# Patient Record
Sex: FEMALE | URBAN - NONMETROPOLITAN AREA
[De-identification: names, ages, dates, MRNs, and addresses within clinical notes are randomized per-mention and may not be internally consistent; named-entity substitution may affect disease eponyms.]

---

## 2020-07-10 ENCOUNTER — APPOINTMENT (OUTPATIENT)
Dept: URBAN - NONMETROPOLITAN AREA CLINIC 53 | Age: 80
Setting detail: DERMATOLOGY
End: 2020-07-10

## 2020-07-10 DIAGNOSIS — L82.1 OTHER SEBORRHEIC KERATOSIS: ICD-10-CM

## 2020-07-10 DIAGNOSIS — L57.8 OTHER SKIN CHANGES DUE TO CHRONIC EXPOSURE TO NONIONIZING RADIATION: ICD-10-CM

## 2020-07-10 PROCEDURE — OTHER NOTED ON EXAM BUT NOT TREATED: OTHER

## 2020-07-10 ASSESSMENT — LOCATION SIMPLE DESCRIPTION DERM
LOCATION SIMPLE: RIGHT POSTERIOR UPPER ARM
LOCATION SIMPLE: RIGHT CHEEK
LOCATION SIMPLE: UPPER BACK
LOCATION SIMPLE: LEFT POSTERIOR UPPER ARM

## 2020-07-10 ASSESSMENT — LOCATION ZONE DERM
LOCATION ZONE: FACE
LOCATION ZONE: ARM
LOCATION ZONE: TRUNK

## 2020-07-10 ASSESSMENT — LOCATION DETAILED DESCRIPTION DERM
LOCATION DETAILED: RIGHT INFERIOR LATERAL MALAR CHEEK
LOCATION DETAILED: INFERIOR THORACIC SPINE
LOCATION DETAILED: LEFT PROXIMAL POSTERIOR UPPER ARM
LOCATION DETAILED: RIGHT DISTAL POSTERIOR UPPER ARM

## 2021-01-03 ENCOUNTER — HOSPITAL ENCOUNTER (INPATIENT)
Dept: HOSPITAL 47 - EC | Age: 81
LOS: 3 days | Discharge: NURSING, REHAB OR LONG-TERM CARE | DRG: 178 | End: 2021-01-06
Attending: HOSPITALIST | Admitting: HOSPITALIST
Payer: MEDICARE

## 2021-01-03 VITALS — BODY MASS INDEX: 29.2 KG/M2

## 2021-01-03 DIAGNOSIS — Z90.710: ICD-10-CM

## 2021-01-03 DIAGNOSIS — Z79.899: ICD-10-CM

## 2021-01-03 DIAGNOSIS — K44.9: ICD-10-CM

## 2021-01-03 DIAGNOSIS — R09.02: ICD-10-CM

## 2021-01-03 DIAGNOSIS — K21.9: ICD-10-CM

## 2021-01-03 DIAGNOSIS — J84.10: ICD-10-CM

## 2021-01-03 DIAGNOSIS — M19.90: ICD-10-CM

## 2021-01-03 DIAGNOSIS — G89.29: ICD-10-CM

## 2021-01-03 DIAGNOSIS — Z87.891: ICD-10-CM

## 2021-01-03 DIAGNOSIS — J15.6: Primary | ICD-10-CM

## 2021-01-03 DIAGNOSIS — M06.9: ICD-10-CM

## 2021-01-03 DIAGNOSIS — K80.20: ICD-10-CM

## 2021-01-03 DIAGNOSIS — E87.1: ICD-10-CM

## 2021-01-03 DIAGNOSIS — I11.9: ICD-10-CM

## 2021-01-03 DIAGNOSIS — Z87.01: ICD-10-CM

## 2021-01-03 DIAGNOSIS — Z86.16: ICD-10-CM

## 2021-01-03 PROCEDURE — 71046 X-RAY EXAM CHEST 2 VIEWS: CPT

## 2021-01-03 PROCEDURE — 80053 COMPREHEN METABOLIC PANEL: CPT

## 2021-01-03 PROCEDURE — 85025 COMPLETE CBC W/AUTO DIFF WBC: CPT

## 2021-01-03 PROCEDURE — 85379 FIBRIN DEGRADATION QUANT: CPT

## 2021-01-03 PROCEDURE — 99285 EMERGENCY DEPT VISIT HI MDM: CPT

## 2021-01-03 PROCEDURE — 84100 ASSAY OF PHOSPHORUS: CPT

## 2021-01-03 PROCEDURE — 87635 SARS-COV-2 COVID-19 AMP PRB: CPT

## 2021-01-03 PROCEDURE — 83735 ASSAY OF MAGNESIUM: CPT

## 2021-01-03 PROCEDURE — 71250 CT THORAX DX C-: CPT

## 2021-01-03 PROCEDURE — 71275 CT ANGIOGRAPHY CHEST: CPT

## 2021-01-03 PROCEDURE — 36415 COLL VENOUS BLD VENIPUNCTURE: CPT

## 2021-01-03 PROCEDURE — 84145 PROCALCITONIN (PCT): CPT

## 2021-01-03 RX ADMIN — AZITHROMYCIN SCH MG: 500 TABLET, FILM COATED ORAL at 22:17

## 2021-01-03 RX ADMIN — CEFAZOLIN SCH MLS/HR: 330 INJECTION, POWDER, FOR SOLUTION INTRAMUSCULAR; INTRAVENOUS at 22:19

## 2021-01-03 RX ADMIN — CEFAZOLIN SCH MLS/HR: 330 INJECTION, POWDER, FOR SOLUTION INTRAMUSCULAR; INTRAVENOUS at 13:27

## 2021-01-03 RX ADMIN — HYDROCODONE BITARTRATE AND ACETAMINOPHEN PRN EACH: 5; 325 TABLET ORAL at 22:16

## 2021-01-03 NOTE — P.HPIM
History of Present Illness


H&P Date: 01/03/21


Chief Complaint: Progressive increased weakness and shortness of breath for 7-10

days





This is a 80-year-old female history of covert 19 pneumonia about 2 months ago, 

patient lately for the last 10 days having increasing shortness of breath, 

weakness, which is definitely worse than before, denies any chest pain denies 

any abdominal pain denies any nausea vomiting diarrhea, patient also has chronic

history of hyponatremia patient presented to [facility] she was found to 

have elevated BNP of 1040, cardiac enzymes were normal, CRP was high at 58 

covert infection was negative patient was given Rocephin and Zithromax and 

transferred to [facility] from [place], chest x-ray significant for 

cardiomegaly with bilateral bibasilar and right upper lobe infiltrate, computed 

tomography scan of the chest failed to reveal any pulmonary embolism however 

bilateral interstitial Petrin was noted





Review of Systems


All systems: negative





Past Medical History


Past Medical History: Hypertension, Pneumonia, Rheumatoid Arthritis (RA)


Additional Past Medical History / Comment(s): right shoulder pain


History of Any Multi-Drug Resistant Organisms: None Reported


Past Surgical History: Hysterectomy


Additional Past Surgical History / Comment(s): 3 X Back surgeries, ankle surgery


Smoking Status: Former smoker





- Past Family History


  ** Father


Family Medical History: No Reported History





Medications and Allergies


                                Home Medications











 Medication  Instructions  Recorded  Confirmed  Type


 


Celecoxib [CeleBREX] 400 mg PO DAILY 01/03/21 01/03/21 History


 


Fluticasone Nasal Spray [Flonase 1 spr EA NOSTRIL DAILY 01/03/21 01/03/21 

History





Nasal Spray]    


 


Losartan Potassium 100 mg PO DAILY 01/03/21 01/03/21 History


 


Nitrofurantoin Monohyd/M-Cryst 100 mg PO MOWEFR 01/03/21 01/03/21 History





[Macrobid]    


 


Omeprazole 20 mg PO DAILY 01/03/21 01/03/21 History


 


Xeljanz Unknown Dose 1 tab PO DAILY 01/03/21 01/03/21 History


 


predniSONE See Taper PO DAILY 01/03/21 01/03/21 History








                                    Allergies











Allergy/AdvReac Type Severity Reaction Status Date / Time


 


No Known Allergies Allergy   Verified 01/03/21 12:05














Physical Exam


Vitals: 


                                   Vital Signs











  Temp Pulse Pulse Resp BP BP Pulse Ox


 


 01/03/21 15:31  98.1 F   85  18   147/89  97


 


 01/03/21 15:09  98.3 F  87   18  156/81   98


 


 01/03/21 13:04     18   


 


 01/03/21 12:10     18   


 


 01/03/21 11:59  98 F  87   16  116/57   97








                                Intake and Output











 01/03/21 01/03/21 01/03/21





 06:59 14:59 22:59


 


Intake Total   75


 


Balance   75


 


Intake:   


 


  Amount of Fluid Infused (   75





  ml)   


 


Other:   


 


  Weight  87.09 kg 87.09 kg














- Constitutional


General appearance: average body habitus, disheveled





- EENT


Eyes: PERRLA


Ears: bilateral: normal





- Neck


Carotids: bilateral: upstroke normal


Thyroid: bilateral: normal size





- Respiratory


Respiratory: bilateral: diminished





- Cardiovascular


Rhythm: regular


Heart sounds: normal: S1, S2





- Gastrointestinal


General gastrointestinal: decreased bowel sounds, soft





- Integumentary


Integumentary: normal turgor





- Neurologic


Neurologic: CNII-XII intact





- Musculoskeletal


Musculoskeletal: gait normal, generalized weakness, strength equal bilaterally





- Psychiatric


Psychiatric: A&O x's 3, appropriate affect, intact judgment & insight





Results


Labs: 


                  Abnormal Lab Results - Last 24 Hours (Table)











  01/03/21 Range/Units





  13:43 


 


D-Dimer  1.56 H  (<0.60)  mg/L FEU











Chest x-ray: report reviewed, image reviewed


Abdominal x-ray: report reviewed, image reviewed





Thrombosis Risk Factor Assmnt





- Choose All That Apply


Any of the Below Risk Factors Present?: Yes


Each Factor Represents 1 point: Medical pt on bed rest


Each Risk Factor Represents 3 Points: Age 75 years or older


Thrombosis Risk Factor Assessment Total Risk Factor Score: 4


Thrombosis Risk Factor Assessment Level: Moderate Risk





Assessment and Plan


Assessment: 





Bilateral pneumonia





Pulmonary fibrosis





covid 19 pneumonia few months ago





Hypertension hypertensive cardiovascular disease





GERD





Degenerative joint disease osteoarthritis


Plan: 





Broad-spectrum antibiotics





Oral Decadron





Deep breathing sense incentive spirometry





Vitamin C, D, zinc








Time with Patient: Greater than 30

## 2021-01-03 NOTE — CT
EXAMINATION TYPE: CT chest angio for PE

 

DATE OF EXAM: 1/3/2021

 

COMPARISON: None

 

HISTORY: Shortness of breath.

 

CT DLP: 519 mGycm

Automated exposure control for dose reduction was used.

 

CONTRAST: 

Performed with IV Contrast, patient injected with 100 mL of Isovue 370.

 

 

Images obtained from the thoracic inlet to the diaphragm with IV contrast and 3-D post processed imag
es.

 

There is some coarse interstitial density throughout the lungs. There is no pulmonary consolidation. 
Heart is enlarged. There is no pericardial effusion. There is no pleural effusion. I see no definite 
filling defect in the pulmonary arteries. There are no hilar masses. There is no mediastinal adenopat
hy. Thoracic aorta is atheromatous. There is posterior fusion surgery in the lumbar spine. There is 1
5% compression deformity of T12 vertebra.

 

IMPRESSION:

No evidence of pulmonary embolism. Pulmonary interstitial infiltrates probably due to pulmonary fibro
sis. Cardiomegaly.

## 2021-01-03 NOTE — ED
SOB HPI





- General


Chief Complaint: Shortness of Breath


Stated Complaint: Pneumonia


Time Seen by Provider: 01/03/21 12:01


Source: patient, EMS


Mode of arrival: EMS


Limitations: no limitations





- History of Present Illness


Initial Comments: 


80-year-old  iwht history of Covid 19 infection "a few months ago female 

presenting for weakness, shortness of breath x7-10 days.  Patient states for the

past week+ she has had increasing weakness and shortness of breath. Pt states 

SOB is chronic but is increased wtih ambulation. She denies chest pain, pain 

with deep breath, nausea, abdominal pain, jaw pain,leg swelling calf pain 

hemoptysis or history of active cancer patient's ,. Admits to chronic right 

shoulder pain. XR yoni no acute process. no trauma. she states she does 

suffer from chronic hyponatremia.  Patient states that having low sodium is "the

story of her life".  Patient states she initially presented earlier today at 

[facility] where she had evaluation including: cardiac enzymes which were

WNL, BNP was 1040, coagulation studies within normal limits, phosphorus within 

normal limits, sodium 123, both potassium and chloride within normal limits. No 

other CMP derrangement noted. Magensium 1.7. Lactic 0.8. No hx of fevers. CRP 

elevated at 58. Covid (-). WBC 12.06. HgB 12.3, Cr 0.7 and BUN 16. Pt does not 

appear in respiratory distress on arrival. She was givne both rocephin and 

azithromycin at [facility] prior to transfer. 








- Related Data


                                Home Medications











 Medication  Instructions  Recorded  Confirmed


 


Celecoxib [CeleBREX] 400 mg PO DAILY 01/03/21 01/03/21


 


Fluticasone Nasal Spray [Flonase 1 spr EA NOSTRIL DAILY 01/03/21 01/03/21





Nasal [place]]   


 


Losartan Potassium 100 mg PO DAILY 01/03/21 01/03/21


 


Nitrofurantoin Monohyd/M-Cryst 100 mg PO MOWEFR 01/03/21 01/03/21





[Macrobid]   


 


Omeprazole 20 mg PO DAILY 01/03/21 01/03/21


 


Xeljanz Unknown Dose 1 tab PO DAILY 01/03/21 01/03/21


 


predniSONE See Taper PO DAILY 01/03/21 01/03/21











                                    Allergies











Allergy/AdvReac Type Severity Reaction Status Date / Time


 


No Known Allergies Allergy   Verified 01/03/21 12:05














Review of Systems


ROS Statement: 


Those systems with pertinent positive or pertinent negative responses have been 

documented in the HPI.





ROS Other: All systems not noted in ROS Statement are negative.





Past Medical History


Past Medical History: Hypertension, Pneumonia


History of Any Multi-Drug Resistant Organisms: None Reported


Past Surgical History: Hysterectomy


Additional Past Surgical History / Comment(s): 3 X Back surgeries


Past Psychological History: No Psychological Hx Reported


Smoking Status: Former smoker


Past Alcohol Use History: None Reported


Past Drug Use History: None Reported





General Exam





- General Exam Comments


Initial Comments: 


General:  The patient is awake and alert, in no distress


Eye:  Pupils are equal, round and reactive to light, extra-ocular movements are 

intact.  No nystagmus.  There is normal conjunctiva bilaterally.  No signs of 

icterus.  


Ears, nose, mouth and throat:  There are moist mucous membranes and no oral 

lesions. 


Neck:  The neck is supple, there is no tenderness or JVD.  


Cardiovascular:  There is a regular rate and rhythm. No murmur, rub or gallop is

 appreciated.


Respiratory:  Lungs are clear to auscultation, respirations are non-labored, 

breath sounds are equal.  No wheezes, stridor, rales, or rhonchi.


Gastrointestinal:  Soft, non-distended, non-tender abdomen without masses or 

organomegaly noted. There is no rebound or guarding present. 


Musculoskeletal:  Normal ROM, no tenderness.  Strength 5/5. Sensation intact. 

Radial and DP pulses equal bilaterally 2+.  


Neurological:  A&O x 3. CN II-XII intact, There are no obvious motor or sensory 

deficits. Coordination appears grossly intact. Speech is normal.


Skin:  Skin is warm and dry and no rashes or lesions are noted. No calf pain, No

 LE edema.


Psychiatric:  Cooperative, appropriate mood & affect, normal judgment.  





Limitations: no limitations





Course


                                   Vital Signs











  01/03/21 01/03/21 01/03/21





  11:59 12:10 13:04


 


Temperature 98 F  


 


Pulse Rate 87  


 


Respiratory 16 18 18





Rate   


 


Blood Pressure 116/57  


 


O2 Sat by Pulse 97  





Oximetry   














  01/03/21





  15:09


 


Temperature 98.3 F


 


Pulse Rate 87


 


Respiratory 18





Rate 


 


Blood Pressure 156/81


 


O2 Sat by Pulse 98





Oximetry 














Medical Decision Making





- Medical Decision Making


transfer for dyspnea, right shoulder pain, pneumonia from [facility]. Labs reveal

 hyponatremia. pt on IVF. pt has history of bsaeline in 130s. patient has recent

 covid infection in november. covid (-) at [place]. Patient CXR infiltrate. 

Patient initiated on abx at outside facilities. patient cardiac enzymes (-). 

Dimer evalted. CTA revealed no pulmonary embolism. pt will be admitted for 

further monitoring.  [facility]











- Lab Data


                                   Lab Results











  01/03/21 Range/Units





  13:43 


 


D-Dimer  1.56 H  (<0.60)  mg/L FEU














Disposition


Clinical Impression: 


 Dyspnea, Pneumonia, Right shoulder pain





Disposition: ADMITTED AS IP TO THIS HOSP


Condition: Stable


Is patient prescribed a controlled substance at d/c from ED?: No


Time of Disposition: 15:23


Decision to Admit Reason: Admit from EC


Decision Date: 01/03/21


Decision Time: 13:23

## 2021-01-03 NOTE — XR
EXAMINATION TYPE: XR chest 2V

 

DATE OF EXAM: 1/3/2021

 

COMPARISON: Chest x-ray March 13, 2014. Outside chest x-ray earlier today

 

HISTORY: Shortness of breath

 

TECHNIQUE:  Frontal and lateral views of the chest are obtained.

 

FINDINGS:  There is chronic parenchymal change with increased right upper lung and bibasilar opacitie
s.  No pleural effusion or pneumothorax seen bilaterally. The cardiac silhouette size is stable and m
ildly enlarged.   The osseous structures are intact.

 

IMPRESSION:  Mild cardiomegaly with bibasilar and right upper lung acute infiltrates and/or atelectas
is.

## 2021-01-04 LAB
ALBUMIN SERPL-MCNC: 3.8 G/DL (ref 3.8–4.9)
ALBUMIN/GLOB SERPL: 1.9 G/DL (ref 1.6–3.17)
ALP SERPL-CCNC: 80 U/L (ref 41–126)
ALT SERPL-CCNC: 19 U/L (ref 8–44)
ANION GAP SERPL CALC-SCNC: 6.2 MMOL/L (ref 4–12)
AST SERPL-CCNC: 19 U/L (ref 13–35)
BASOPHILS # BLD AUTO: 0 K/UL (ref 0–0.2)
BASOPHILS NFR BLD AUTO: 0 %
BUN SERPL-SCNC: 19 MG/DL (ref 9–27)
BUN/CREAT SERPL: 31.67 RATIO (ref 12–20)
CALCIUM SPEC-MCNC: 9 MG/DL (ref 8.7–10.3)
CHLORIDE SERPL-SCNC: 99 MMOL/L (ref 96–109)
CO2 SERPL-SCNC: 21.8 MMOL/L (ref 21.6–31.8)
EOSINOPHIL # BLD AUTO: 0 K/UL (ref 0–0.7)
EOSINOPHIL NFR BLD AUTO: 0 %
ERYTHROCYTE [DISTWIDTH] IN BLOOD BY AUTOMATED COUNT: 3.56 M/UL (ref 3.8–5.4)
ERYTHROCYTE [DISTWIDTH] IN BLOOD: 14.4 % (ref 11.5–15.5)
GLOBULIN SER CALC-MCNC: 2 G/DL (ref 1.6–3.3)
GLUCOSE SERPL-MCNC: 129 MG/DL (ref 70–110)
HCT VFR BLD AUTO: 34.4 % (ref 34–46)
HGB BLD-MCNC: 11.4 GM/DL (ref 11.4–16)
LYMPHOCYTES # SPEC AUTO: 0.4 K/UL (ref 1–4.8)
LYMPHOCYTES NFR SPEC AUTO: 6 %
MAGNESIUM SPEC-SCNC: 1.8 MG/DL (ref 1.5–2.4)
MCH RBC QN AUTO: 32.1 PG (ref 25–35)
MCHC RBC AUTO-ENTMCNC: 33.2 G/DL (ref 31–37)
MCV RBC AUTO: 96.6 FL (ref 80–100)
MONOCYTES # BLD AUTO: 0.2 K/UL (ref 0–1)
MONOCYTES NFR BLD AUTO: 3 %
NEUTROPHILS # BLD AUTO: 6 K/UL (ref 1.3–7.7)
NEUTROPHILS NFR BLD AUTO: 90 %
PLATELET # BLD AUTO: 366 K/UL (ref 150–450)
POTASSIUM SERPL-SCNC: 4.9 MMOL/L (ref 3.5–5.5)
PROT SERPL-MCNC: 5.8 G/DL (ref 6.2–8.2)
SODIUM SERPL-SCNC: 127 MMOL/L (ref 135–145)
WBC # BLD AUTO: 6.7 K/UL (ref 3.8–10.6)

## 2021-01-04 RX ADMIN — FLUTICASONE PROPIONATE SCH SPRAY: 50 SPRAY, METERED NASAL at 09:50

## 2021-01-04 RX ADMIN — HYDROCODONE BITARTRATE AND ACETAMINOPHEN PRN EACH: 5; 325 TABLET ORAL at 20:21

## 2021-01-04 RX ADMIN — Medication SCH UNIT: at 09:47

## 2021-01-04 RX ADMIN — Medication SCH MG: at 09:47

## 2021-01-04 RX ADMIN — MELOXICAM SCH MG: 7.5 TABLET ORAL at 09:48

## 2021-01-04 RX ADMIN — AZITHROMYCIN SCH MG: 500 TABLET, FILM COATED ORAL at 20:15

## 2021-01-04 RX ADMIN — NITROFURANTOIN (MONOHYDRATE/MACROCRYSTALS) SCH MG: 75; 25 CAPSULE ORAL at 09:47

## 2021-01-04 RX ADMIN — LOSARTAN POTASSIUM SCH MG: 50 TABLET, FILM COATED ORAL at 09:49

## 2021-01-04 RX ADMIN — OXYCODONE HYDROCHLORIDE AND ACETAMINOPHEN SCH MG: 500 TABLET ORAL at 09:50

## 2021-01-04 RX ADMIN — PANTOPRAZOLE SODIUM SCH MG: 40 TABLET, DELAYED RELEASE ORAL at 09:50

## 2021-01-04 RX ADMIN — CALCIUM CARBONATE (ANTACID) CHEW TAB 500 MG PRN MG: 500 CHEW TAB at 20:15

## 2021-01-05 RX ADMIN — CEFAZOLIN SCH: 330 INJECTION, POWDER, FOR SOLUTION INTRAMUSCULAR; INTRAVENOUS at 13:07

## 2021-01-05 RX ADMIN — LOSARTAN POTASSIUM SCH MG: 50 TABLET, FILM COATED ORAL at 08:16

## 2021-01-05 RX ADMIN — CALCIUM CARBONATE (ANTACID) CHEW TAB 500 MG PRN MG: 500 CHEW TAB at 23:33

## 2021-01-05 RX ADMIN — MELOXICAM SCH MG: 7.5 TABLET ORAL at 08:16

## 2021-01-05 RX ADMIN — Medication SCH MG: at 08:16

## 2021-01-05 RX ADMIN — CEFAZOLIN SCH: 330 INJECTION, POWDER, FOR SOLUTION INTRAMUSCULAR; INTRAVENOUS at 00:33

## 2021-01-05 RX ADMIN — Medication SCH UNIT: at 08:17

## 2021-01-05 RX ADMIN — CEFAZOLIN SCH: 330 INJECTION, POWDER, FOR SOLUTION INTRAMUSCULAR; INTRAVENOUS at 18:20

## 2021-01-05 RX ADMIN — FLUTICASONE PROPIONATE SCH SPRAY: 50 SPRAY, METERED NASAL at 08:22

## 2021-01-05 RX ADMIN — HYDROCODONE BITARTRATE AND ACETAMINOPHEN PRN EACH: 5; 325 TABLET ORAL at 21:31

## 2021-01-05 RX ADMIN — OXYCODONE HYDROCHLORIDE AND ACETAMINOPHEN SCH MG: 500 TABLET ORAL at 08:17

## 2021-01-05 RX ADMIN — PANTOPRAZOLE SODIUM SCH MG: 40 TABLET, DELAYED RELEASE ORAL at 08:17

## 2021-01-05 RX ADMIN — AZITHROMYCIN SCH MG: 500 TABLET, FILM COATED ORAL at 21:31

## 2021-01-05 NOTE — P.CNPUL
History of Present Illness


Consult date: 01/05/21


Reason for consult: dyspnea, cough, hypoxemia, pneumonia


Chief complaint: Shortness of breath


History of present illness: 


This is a 80-year-old female history of covid 19 pneumonia about 2 months ago, 

patient lately for the last 10 days having increasing shortness of breath, 

weakness, which is definitely worse than before, denies any chest pain denies 

any abdominal pain denies any nausea vomiting diarrhea, patient also has chronic

history of hyponatremia patient presented to [facility] she was found to 

have elevated BNP of 1040, cardiac enzymes were normal, CRP was high at 58 

covert infection was negative patient was given Rocephin and Zithromax and 

transferred to [facility] from [place], chest x-ray significant for 

cardiomegaly with bilateral bibasilar and right upper lobe infiltrate, computed 

tomography scan of the chest failed to reveal any pulmonary embolism however 

bilateral interstitial Pattern was noted, currently patient is being treated for

secondary bacterial pneumonia with IV antibiotics along with dexamethasone usual

therapy for Covid 19 pneumonia she is afebrile with oxygen saturation of 96% 

hemodynamic status stable, she underwent a computed tomography scan of the chest

revealed interstitial lung disease or a fibrosis and gallstones








Review of Systems


All systems: negative





Past Medical History


Past Medical History: Hypertension, Pneumonia, Rheumatoid Arthritis (RA)


Additional Past Medical History / Comment(s): right shoulder pain


History of Any Multi-Drug Resistant Organisms: None Reported


Past Surgical History: Hysterectomy


Additional Past Surgical History / Comment(s): 3 X Back surgeries, ankle surgery


Smoking Status: Former smoker





- Past Family History


  ** Father


Family Medical History: No Reported History





Medications and Allergies


                                Home Medications











 Medication  Instructions  Recorded  Confirmed  Type


 


Celecoxib [CeleBREX] 400 mg PO DAILY 01/03/21 01/03/21 History


 


Fluticasone Nasal Spray [Flonase 1 spr EA NOSTRIL DAILY 01/03/21 01/03/21 

History





Nasal Spray]    


 


Losartan Potassium 100 mg PO DAILY 01/03/21 01/03/21 History


 


Nitrofurantoin Monohyd/M-Cryst 100 mg PO MOWEFR 01/03/21 01/03/21 History





[Macrobid]    


 


Omeprazole 20 mg PO DAILY 01/03/21 01/03/21 History


 


predniSONE See Taper PO DAILY 01/03/21 01/03/21 History


 


Tofacitinib Citrate [Xeljanz Xr] 11 mg PO DAILY 01/04/21 01/04/21 History








                                    Allergies











Allergy/AdvReac Type Severity Reaction Status Date / Time


 


No Known Allergies Allergy   Verified 01/03/21 12:05














Physical Exam


Vitals: 


                                   Vital Signs











  Temp Pulse Resp BP Pulse Ox


 


 01/05/21 05:00  98 F  81  18  161/83  97


 


 01/04/21 21:26  97.5 F L  82  18  154/71  96


 


 01/04/21 17:00  98.3 F  83  16  137/82  94 L








                                Intake and Output











 01/04/21 01/05/21 01/05/21





 22:59 06:59 14:59


 


Intake Total 50  


 


Balance 50  


 


Intake:   


 


  Intake, IV Titration 50  





  Amount   


 


    cefTRIAXone 1 gm In 50  





    Sodium Chloride 0.9% 50   





    ml @ 100 mls/hr IVPB   





    Q24HR Count includes the [facility] Rx#:[id number]   


 


Other:   


 


  Voiding Method Bedside Commode  





 Diaper  


 


  # Voids  3 














- Constitutional


General appearance: average body habitus, cooperative





- EENT


Eyes: PERRLA


Ears: bilateral: normal





- Neck


Carotids: bilateral: upstroke normal


Thyroid: bilateral: normal size





- Respiratory


Respiratory: bilateral: CTA





- Cardiovascular


Rhythm: regular


Heart sounds: normal: S1, S2





- Gastrointestinal


General gastrointestinal: normal bowel sounds





- Neurologic


Neurologic: CNII-XII intact





- Musculoskeletal


Musculoskeletal: gait normal, generalized weakness, strength equal bilaterally





- Psychiatric


Psychiatric: A&O x's 3, appropriate affect, intact judgment & insight





Results





- Laboratory Findings


CBC and BMP: 


                                 01/04/21 05:35





                                 01/04/21 05:35


PT/INR, D-dimer











D-Dimer  1.56 mg/L FEU (<0.60)  H  01/03/21  13:43    








Abnormal lab findings: 


                                  Abnormal Labs











  01/03/21 01/04/21 01/04/21





  13:43 05:35 05:35


 


RBC   3.56 L 


 


Lymphocytes #   0.4 L 


 


D-Dimer  1.56 H  


 


Sodium    127 L


 


BUN/Creatinine Ratio    31.67 H


 


Glucose    129 H


 


Total Protein    5.8 L














- Diagnostic Findings


Chest x-ray: report reviewed, image reviewed


CT scan - chest: report reviewed (Finding as noted above), image reviewed





Assessment and Plan


Assessment: 


Secondary bacterial pneumonia





Pulmonary fibrosis may very well be related to Covid 19 infection recently





covid 19 pneumonia few months ago





Hypertension hypertensive cardiovascular disease





GERD





Degenerative joint disease osteoarthritis


Plan: 





Broad-spectrum antibiotics





Oral Decadron





Deep breathing sense incentive spirometry





Vitamin C, D, zinc





Monitor clinical course closely agree with discharge planning in next 24-48 

hours we'll follow up on outpatient basis











Time with Patient: Greater than 30

## 2021-01-05 NOTE — P.PN
Progress Note - Text


Progress Note Date: 01/05/21





Presenting complaint:


Shortness of breath





Interval history:


This is a patient with history of coronary 19 infection a few months ago 

presents with weakness shortness of breath for 7-10 days.  Especially with 

activity.  Has some chronic right shoulder pain.  Patient had low-sodium most of

her life.  Transferred here from [facility].


Today-a bit tired.  Has been in bed.  Oral intake fair.  Slight cough.





Review of systems: Was done for constitutional, cardiovascular, GI, pulmonary. 

relevant finding as above





Active Medications





Acetaminophen (Acetaminophen Tab 325 Mg Tab)  650 mg PO Q6HR PRN


   PRN Reason: Mild Pain or Fever > 100.5


Hydrocodone Bitart/Acetaminophen (Hydrocodone/Apap 5-325mg 1 Each Tab)  1 each 

PO Q8H PRN


   PRN Reason: Pain


   Last Admin: 01/05/21 21:31 Dose:  1 each


   Documented by: 


Ascorbic Acid (Ascorbic Acid 500 Mg Tab)  1,000 mg PO DAILY [facility]


   Last Admin: 01/05/21 08:17 Dose:  1,000 mg


   Documented by: 


Azithromycin (Azithromycin 500 Mg Tab)  500 mg PO HS [facility]


   Last Admin: 01/05/21 21:31 Dose:  500 mg


   Documented by: 


Calcium Carbonate/Glycine (Calcium Carbonate 500 Mg Chewable)  500 mg PO QID PRN


   PRN Reason: Heartburn


   Last Admin: 01/04/21 20:15 Dose:  500 mg


   Documented by: 


Cholecalciferol (Cholecalciferol 1,000 Unit Tab)  1,000 unit PO DAILY [facility]


   Last Admin: 01/05/21 08:17 Dose:  1,000 unit


   Documented by: 


Dexamethasone (Dexamethasone 2 Mg Tab)  6 mg PO DAILY [facility]


   Last Admin: 01/05/21 08:16 Dose:  6 mg


   Documented by: 


Fluticasone Propionate (Fluticasone 50mcg/Spray Nasal 16gm)  1 spray EA NOSTRIL 

DAILY [facility]


   Last Admin: 01/05/21 08:22 Dose:  1 spray


   Documented by: 


Sodium Chloride (Saline 0.9%)  1,000 mls @ 75 mls/hr IV .[id number] [facility]


   Last Admin: 01/05/21 18:20 Dose:  Not Given


   Documented by: 


Ceftriaxone Sodium 1 gm/ (Sodium Chloride)  50 mls @ 100 mls/hr IVPB Q24HR [facility]


   Last Admin: 01/05/21 08:15 Dose:  100 mls/hr


   Documented by: 


Losartan Potassium (Losartan 50 Mg Tab)  100 mg PO DAILY [facility]


   Last Admin: 01/05/21 08:16 Dose:  100 mg


   Documented by: 


Meloxicam (Meloxicam 7.5 Mg Tab)  15 mg PO DAILY [facility]


   Last Admin: 01/05/21 08:16 Dose:  15 mg


   Documented by: 


Naloxone HCl (Naloxone 0.4 Mg/Ml 1 Ml Vial)  0.2 mg IV Q2M PRN


   PRN Reason: Opioid Reversal


Nitrofurantoin Macrocrystals (Nitrofurantoin Monohyd/M-Cryst 100 Mg Cap)  100 mg

PO MOWEFR [facility]


   Last Admin: 01/04/21 09:47 Dose:  100 mg


   Documented by: 


Pantoprazole Sodium (Pantoprazole 40 Mg Tablet)  40 mg PO DAILY [facility]


   Last Admin: 01/05/21 08:17 Dose:  40 mg


   Documented by: 


Zinc Sulfate (Zinc Sulfate 220 Mg Cap)  220 mg PO DAILY [facility]


   Last Admin: 01/05/21 08:16 Dose:  220 mg


   Documented by: 





On examination:


VITAL SIGNS: 98.5, 82, 20, 149/66, 94% room air


GENERAL APPEARANCE: Laying in bed, comfortable


HEENT: Normal external appearance of nose and ear.  Oral cavity normal


EYES: Pupils equal. Conjunctiva normal. 


NECK: JVD not raised. Mass not palpable. 


RESPIRATORY: Respiratory effort increased, some basal fine crackles 


CARDIOVASCULAR: First and second sounds normal. No edema. 


ABDOMEN: Soft. Liver and spleen not palpable. No tenderness. No mass palpable. 


PSYCHIATRY: Alert and oriented x3. Mood and affect normal. 





Investigations:


January 5: High resolution CT chest.  Chronic interstitial lung 

disease/pulmonary fibrosis.  Hyperinflation.  Gallstones moderate-sized hiatal 

hernia.


White count 6.7 hemoglobin 11.4 platelets 366 potassium 4.8 creatinine 0.6 

sodium 127


Coronary lower iris P/Cr-not detected


January 3: Chest CT: Pulmonary interstitial infiltrates.  Cardiac megaly.  

Negative for PE.





Assessment:


-Interstitial lung disease/pelvic fibrosis symptomatic.  Could be post COVID or 

could be rheumatoid lung


-Gallstones asymptomatic


-Moderate hiatal hernia


-Essential hypertension


-Rheumatoid arthritis





Plan:


Continue current medication treatment plan.  Discussed with pulmonary.  

Discussed with the patient.  Recheck pro-calcitonin the morning.

## 2021-01-05 NOTE — CT
EXAMINATION TYPE: CT chest wo con

 

DATE OF EXAM: 1/5/2021

 

COMPARISON: 1/3/2021

 

HISTORY: pulmonary fibrosis

 

CT DLP: 199 mGycm.  Automated Exposure Control for Dose Reduction was Utilized.

 

TECHNIQUE:  CT scan of the thorax is performed without IV contrast.

 

FINDINGS:

 

LUNGS: Along the peripheral margins of the lungs there is interlobular septal thickening. No new cons
olidation. No pneumothorax or pleural effusion. Basilar bronchiectasis noted..

 

MEDIASTINUM: Lack of IV contrast is noted to limit evaluation for mediastinal and especially hilar ad
enopathy. Heart is enlarged. Trace amount of pericardial fluid noted. Atherosclerotic change of the a
neena. Coronary artery calcification noted. There is a moderate-sized hiatal hernia.

 

OTHER: Hypertrophic and degenerative changes spine. Upper pole left renal cyst suspected tiny gallsto
ne noted. 1 cm left hepatic lesion most likely in the basis of a cyst. Measures 11 Hounsfield units.

 

 

IMPRESSION: 

1. Findings are suggestive of chronic interstitial lung disease correlate for pulmonary fibrosis.

2. COPD

3. Gallstones

## 2021-01-06 VITALS — TEMPERATURE: 98 F | HEART RATE: 78 BPM | SYSTOLIC BLOOD PRESSURE: 164 MMHG | DIASTOLIC BLOOD PRESSURE: 82 MMHG

## 2021-01-06 VITALS — RESPIRATION RATE: 20 BRPM

## 2021-01-06 RX ADMIN — PANTOPRAZOLE SODIUM SCH MG: 40 TABLET, DELAYED RELEASE ORAL at 08:38

## 2021-01-06 RX ADMIN — CEFAZOLIN SCH: 330 INJECTION, POWDER, FOR SOLUTION INTRAMUSCULAR; INTRAVENOUS at 08:37

## 2021-01-06 RX ADMIN — Medication SCH UNIT: at 08:38

## 2021-01-06 RX ADMIN — Medication SCH MG: at 08:38

## 2021-01-06 RX ADMIN — LOSARTAN POTASSIUM SCH MG: 50 TABLET, FILM COATED ORAL at 08:38

## 2021-01-06 RX ADMIN — MELOXICAM SCH MG: 7.5 TABLET ORAL at 08:37

## 2021-01-06 RX ADMIN — FLUTICASONE PROPIONATE SCH SPRAY: 50 SPRAY, METERED NASAL at 08:39

## 2021-01-06 RX ADMIN — OXYCODONE HYDROCHLORIDE AND ACETAMINOPHEN SCH MG: 500 TABLET ORAL at 08:38

## 2021-01-06 RX ADMIN — NITROFURANTOIN (MONOHYDRATE/MACROCRYSTALS) SCH MG: 75; 25 CAPSULE ORAL at 08:38

## 2021-01-06 NOTE — P.DS
Providers


Date of admission: 


01/03/21 14:18





Expected date of discharge: 01/06/21


Attending physician: 


John Stone





Consults: 





                                        





01/05/21 10:25


Consult Physician Routine 


   Consulting Provider: Erick Floyd


   Consult Reason/Comments: sob


   Do you want consulting provider notified?: Yes











Primary care physician: 


[facility] Course: 





Presenting complaint:


Shortness of breath





Interval history:


This is a patient with history of COVID 19 infection a few months ago presents 

with weakness shortness of breath for 7-10 days.  Especially with activity.  Has

some chronic right shoulder pain.  Patient had low-sodium most of her life.  

Transferred here from [facility].  High resolution CT chest showed 

pulmonary fibrosis.  This could be from rheumatoid arthritis or could be result 

of the COVID 19 infection.  Also treated with secondary bacterial infection.








Today-doing better.  Discussed with the patient.  Pending much better.  Oral 

intake is good.  Does use a walker.  With follow-up outpatient with orthopedics 

for her right shoulder pain.  Discussed with .


Discussion and discharge planning more than 35 minutes





Consultation:


Dr. MAURY Floyd from pulmonary











On examination:


VITAL SIGNS: 98, 78, 20, 164/82, 95% room air


GENERAL APPEARANCE: Laying in bed, comfortable


HEENT: Normal external appearance of nose and ear.  Oral cavity normal


EYES: Pupils equal. Conjunctiva normal. 


NECK: JVD not raised. Mass not palpable. 


RESPIRATORY: Respiratory effort increased, some basal fine crackles 


CARDIOVASCULAR: First and second sounds normal. No edema. 


ABDOMEN: Soft. Liver and spleen not palpable. No tenderness. No mass palpable. 


PSYCHIATRY: Alert and oriented x3. Mood and affect normal. 





Investigations:


GEN V6: Pro-calcitonin 0.1


January 5: High resolution CT chest.  Chronic interstitial lung 

disease/pulmonary fibrosis.  Hyperinflation.  Gallstones moderate-sized hiatal 

hernia.


White count 6.7 hemoglobin 11.4 platelets 366 potassium 4.8 creatinine 0.6 sodiu

m 127


Coronary lower iris P/Cr-not detected


January 3: Chest CT: Pulmonary interstitial infiltrates.  Cardiac megaly.  

Negative for PE.





Assessment:


-Pneumonia suspect gram-negative organism


-Interstitial lung disease/pelvic fibrosis symptomatic.  Could be post COVID or 

could be rheumatoid lung


-Gallstones asymptomatic


-Moderate hiatal hernia


-Essential hypertension


-Rheumatoid arthritis








Disposition:


[facility]


Patient Condition at Discharge: Stable





Plan - Discharge Summary


New Discharge Prescriptions: 


New


   Cefuroxime Axetil [Ceftin] 500 mg PO BID #6 tab


   Zinc Sulfate [Orazinc] 220 mg PO DAILY  cap


   predniSONE 10 mg PO DAILY #30 tab


   Calcium Carbonate [Tums] 500 mg PO QID PRN  chew


     PRN Reason: Heartburn


   Acetaminophen Tab [Tylenol] 650 mg PO Q6HR PRN  tab


     PRN Reason: Mild Pain Or Fever > 100.5


   Ascorbic Acid [Vitamin C] 1,000 mg PO DAILY  tab


   Cholecalciferol [Vitamin D3 (25 Mcg = 1000 Iu)] 1,000 unit PO DAILY  tab





Continue


   Nitrofurantoin Monohyd/M-Cryst [Macrobid] 100 mg PO MOWEFR


   Losartan Potassium 100 mg PO DAILY


   Fluticasone Nasal Spray [Flonase Nasal Spray] 1 spr EA NOSTRIL DAILY


   Celecoxib [CeleBREX] 400 mg PO DAILY


   Omeprazole 20 mg PO DAILY


   Tofacitinib Citrate [Xeljanz Xr] 11 mg PO DAILY





Discontinued


   predniSONE See Taper PO DAILY


Discharge Medication List





Celecoxib [CeleBREX] 400 mg PO DAILY 01/03/21 [History]


Fluticasone Nasal Spray [Flonase Nasal Spray] 1 spr EA NOSTRIL DAILY 01/03/21 

[History]


Losartan Potassium 100 mg PO DAILY 01/03/21 [History]


Nitrofurantoin Monohyd/M-Cryst [Macrobid] 100 mg PO MOWEFR 01/03/21 [History]


Omeprazole 20 mg PO DAILY 01/03/21 [History]


Tofacitinib Citrate [Xeljanz Xr] 11 mg PO DAILY 01/04/21 [History]


Acetaminophen Tab [Tylenol] 650 mg PO Q6HR PRN  tab 01/06/21 [Rx]


Ascorbic Acid [Vitamin C] 1,000 mg PO DAILY  tab 01/06/21 [Rx]


Calcium Carbonate [Tums] 500 mg PO QID PRN  chew 01/06/21 [Rx]


Cefuroxime Axetil [Ceftin] 500 mg PO BID #6 tab 01/06/21 [Rx]


Cholecalciferol [Vitamin D3 (25 Mcg = 1000 Iu)] 1,000 unit PO DAILY  tab 01/06 /21 [Rx]


Zinc Sulfate [Orazinc] 220 mg PO DAILY  cap 01/06/21 [Rx]


predniSONE 10 mg PO DAILY #30 tab 01/06/21 [Rx]








Follow up Appointment(s)/Referral(s): 


Jem Strange MD [Primary Care Provider] - 1-2 days


Spencer Dorado MD [STAFF PHYSICIAN] - 1 Week (r soulder pain)


Erick Floyd MD [STAFF PHYSICIAN] - 2 Weeks


Discharge/Stand Alone Forms:  Who Do I Call?, Help In The Home

## 2021-01-06 NOTE — P.PN
Subjective


Progress Note Date: 01/06/21


Principal diagnosis: 


Secondary bacterial pneumonia





Pulmonary fibrosis may very well be related to Covid 19 infection recently





covid 19 pneumonia few months ago





Hypertension hypertensive cardiovascular disease





GERD





Degenerative joint disease osteoarthritis








01/06/2021, patient seen eval examined during the rounds labs reviewed 

medications reviewed care plan discussed, cough shortness of breath is stable 

patient will foam air breathing comfortably oxygen saturation 98%, blood 

pressure and heart rate pulse remains stable patient is afebrile, recommend 

pulse ox check post exertion of walking to assess for home oxygen need





This is a 80-year-old female history of covid 19 pneumonia about 2 months ago, 

patient lately for the last 10 days having increasing shortness of breath, 

weakness, which is definitely worse than before, denies any chest pain denies 

any abdominal pain denies any nausea vomiting diarrhea, patient also has chronic

history of hyponatremia patient presented to [facility] she was found to 

have elevated BNP of 1040, cardiac enzymes were normal, CRP was high at 58 

covert infection was negative patient was given Rocephin and Zithromax and trans

ferred to [facility] from [place], chest x-ray significant for 

cardiomegaly with bilateral bibasilar and right upper lobe infiltrate, computed 

tomography scan of the chest failed to reveal any pulmonary embolism however 

bilateral interstitial Pattern was noted, currently patient is being treated for

secondary bacterial pneumonia with IV antibiotics along with dexamethasone usual

therapy for Covid 19 pneumonia she is afebrile with oxygen saturation of 96% 

hemodynamic status stable, she underwent a computed tomography scan of the chest

revealed interstitial lung disease or a fibrosis and gallstones





Objective





- Vital Signs


Vital signs: 


                                   Vital Signs











Temp  98.2 F   01/06/21 05:00


 


Pulse  91   01/06/21 05:00


 


Resp  20   01/06/21 05:00


 


BP  136/70   01/06/21 05:00


 


Pulse Ox  98   01/06/21 05:00








                                 Intake & Output











 01/05/21 01/06/21 01/06/21





 18:59 06:59 18:59


 


Intake Total  1000 


 


Balance  1000 


 


Intake:   


 


  Oral  1000 


 


Other:   


 


  Voiding Method Bedside Commode Bedside Commode Bedside Commode





 Diaper Diaper Diaper


 


  # Voids 2 4 














- Exam





- Constitutional


General appearance: average body habitus, cooperative





- EENT


Eyes: PERRLA


Ears: bilateral: normal





- Neck


Carotids: bilateral: upstroke normal


Thyroid: bilateral: normal size





- Respiratory


Respiratory: bilateral: CTA





- Cardiovascular


Rhythm: regular


Heart sounds: normal: S1, S2





- Gastrointestinal


General gastrointestinal: normal bowel sounds





- Neurologic


Neurologic: CNII-XII intact





- Musculoskeletal


Musculoskeletal: gait normal, generalized weakness, strength equal bilaterally





- Psychiatric


Psychiatric: A&O x's 3, appropriate affect, intact judgment & insight











- Labs


CBC & Chem 7: 


                                 01/04/21 05:35





                                 01/04/21 05:35


Labs: 


                  Abnormal Lab Results - Last 24 Hours (Table)











  01/06/21 Range/Units





  05:32 


 


Procalcitonin  0.10 H  (0.02-0.09)  ng/mL














Assessment and Plan


Assessment: 


Secondary bacterial pneumonia





Pulmonary fibrosis may very well be related to Covid 19 infection recently





covid 19 pneumonia few months ago





Hypertension hypertensive cardiovascular disease





GERD





Degenerative joint disease osteoarthritis


Plan: 





Broad-spectrum antibiotics





Oral Decadron





Deep breathing sense incentive spirometry





Vitamin C, D, zinc





Check pulse ox at room air post exertion for home oxygen assessment





Monitor clinical course closely agree with discharge planning in next 24-48 

hours we'll follow up on outpatient basis











Time with Patient: Greater than 30

## 2021-03-22 ENCOUNTER — HOSPITAL ENCOUNTER (OUTPATIENT)
Dept: HOSPITAL 47 - RADCTMAIN | Age: 81
End: 2021-03-22
Attending: ORTHOPAEDIC SURGERY
Payer: MEDICARE

## 2021-03-22 DIAGNOSIS — M89.311: ICD-10-CM

## 2021-03-22 DIAGNOSIS — M62.511: Primary | ICD-10-CM

## 2021-03-22 NOTE — CT
EXAMINATION TYPE: CT shoulder RT wo con

 

DATE OF EXAM: 3/22/2021

 

COMPARISON: Outside right shoulder x-ray January 3, 2021.

 

HISTORY: pain RT shoulder. Pt denies injury

 

CT DLP: 474 mGycm

Automated exposure control for dose reduction was used.

 

FINDINGS: 

Stable moderate narrowing at acromioclavicular joint with mild to moderate superior capsular hypertro
phy.

 

Since January 3, 2021 there is new well-defined deformity of the humeral head with peak shaped morpho
logy on current study. There is anterior inferior subluxation of the humeral head relative to the gle
noid cavity with heterogeneous low dense suspected joint effusion extending both medially and anterio
rly and to a lesser degree posterior laterally with curvilinear density along the periphery possible 
calcification. Marked narrowing of the humeral head relative to the anterior osseous glenoid along th
e inferior aspect where there is bone-on-bone formation. No irregular bony destruction is seen.

 

Supraspinatus muscle shows mild to moderate generalized fat replaced atrophy. Major rotator cuff musc
le bulk is preserved. Visualized ribs are intact.

 

Visualized right lung shows mild to moderate emphysematous change and mild peripheral fibrotic change
.

 

IMPRESSION: As above. Significant change from outside x-ray approximately 10 weeks earlier. Suspect n
onsimple glenohumeral joint  effusion with loss of spherical shape to the humeral head, well-defined 
erosive changes felt present. There is anterior inferior subluxation with marked glenohumeral joint n
arrowing. Correlate for septic arthritis. Consider needle aspiration for further analysis.

## 2021-04-26 ENCOUNTER — HOSPITAL ENCOUNTER (OUTPATIENT)
Dept: HOSPITAL 47 - LABPAT | Age: 81
Discharge: HOME | End: 2021-04-26
Attending: ORTHOPAEDIC SURGERY
Payer: MEDICARE

## 2021-04-26 DIAGNOSIS — Z01.818: Primary | ICD-10-CM

## 2021-04-26 DIAGNOSIS — Z01.812: ICD-10-CM

## 2021-04-26 LAB
ALBUMIN SERPL-MCNC: 4.3 G/DL (ref 3.5–5)
ALP SERPL-CCNC: 90 U/L (ref 38–126)
ALT SERPL-CCNC: 11 U/L (ref 4–34)
ANION GAP SERPL CALC-SCNC: 9 MMOL/L
APTT BLD: 24.9 SEC (ref 22–30)
AST SERPL-CCNC: 24 U/L (ref 14–36)
BUN SERPL-SCNC: 19 MG/DL (ref 7–17)
CALCIUM SPEC-MCNC: 10.3 MG/DL (ref 8.4–10.2)
CHLORIDE SERPL-SCNC: 92 MMOL/L (ref 98–107)
CO2 SERPL-SCNC: 28 MMOL/L (ref 22–30)
ERYTHROCYTE [DISTWIDTH] IN BLOOD BY AUTOMATED COUNT: 3.43 M/UL (ref 3.8–5.4)
ERYTHROCYTE [DISTWIDTH] IN BLOOD: 12.3 % (ref 11.5–15.5)
GLUCOSE SERPL-MCNC: 114 MG/DL (ref 74–99)
HCT VFR BLD AUTO: 32.6 % (ref 34–46)
HGB BLD-MCNC: 11.3 GM/DL (ref 11.4–16)
HYALINE CASTS UR QL AUTO: 14 /LPF (ref 0–2)
INR PPP: 0.9 (ref ?–1.2)
MCH RBC QN AUTO: 32.9 PG (ref 25–35)
MCHC RBC AUTO-ENTMCNC: 34.6 G/DL (ref 31–37)
MCV RBC AUTO: 95.1 FL (ref 80–100)
PH UR: 6 [PH] (ref 5–8)
PLATELET # BLD AUTO: 361 K/UL (ref 150–450)
POTASSIUM SERPL-SCNC: 4.8 MMOL/L (ref 3.5–5.1)
PROT SERPL-MCNC: 7 G/DL (ref 6.3–8.2)
PT BLD: 10.1 SEC (ref 9–12)
RBC UR QL: <1 /HPF (ref 0–5)
SODIUM SERPL-SCNC: 129 MMOL/L (ref 137–145)
SP GR UR: 1.02 (ref 1–1.03)
SQUAMOUS UR QL AUTO: 5 /HPF (ref 0–4)
UROBILINOGEN UR QL STRIP: <2 MG/DL (ref ?–2)
WBC # BLD AUTO: 7 K/UL (ref 3.8–10.6)
WBC #/AREA URNS HPF: 2 /HPF (ref 0–5)

## 2021-04-26 PROCEDURE — 85610 PROTHROMBIN TIME: CPT

## 2021-04-26 PROCEDURE — 85027 COMPLETE CBC AUTOMATED: CPT

## 2021-04-26 PROCEDURE — 36415 COLL VENOUS BLD VENIPUNCTURE: CPT

## 2021-04-26 PROCEDURE — 81001 URINALYSIS AUTO W/SCOPE: CPT

## 2021-04-26 PROCEDURE — 87070 CULTURE OTHR SPECIMN AEROBIC: CPT

## 2021-04-26 PROCEDURE — 80053 COMPREHEN METABOLIC PANEL: CPT

## 2021-04-26 PROCEDURE — 85730 THROMBOPLASTIN TIME PARTIAL: CPT

## 2021-05-18 ENCOUNTER — HOSPITAL ENCOUNTER (OUTPATIENT)
Dept: HOSPITAL 47 - OR | Age: 81
LOS: 1 days | Discharge: NURSING, REHAB OR LONG-TERM CARE | End: 2021-05-19
Attending: ORTHOPAEDIC SURGERY
Payer: MEDICARE

## 2021-05-18 VITALS — RESPIRATION RATE: 16 BRPM

## 2021-05-18 VITALS — BODY MASS INDEX: 28.5 KG/M2

## 2021-05-18 DIAGNOSIS — Z79.899: ICD-10-CM

## 2021-05-18 DIAGNOSIS — I10: ICD-10-CM

## 2021-05-18 DIAGNOSIS — R26.81: ICD-10-CM

## 2021-05-18 DIAGNOSIS — E87.1: ICD-10-CM

## 2021-05-18 DIAGNOSIS — Z88.2: ICD-10-CM

## 2021-05-18 DIAGNOSIS — K21.9: ICD-10-CM

## 2021-05-18 DIAGNOSIS — M87.9: Primary | ICD-10-CM

## 2021-05-18 DIAGNOSIS — M19.011: ICD-10-CM

## 2021-05-18 DIAGNOSIS — Z87.891: ICD-10-CM

## 2021-05-18 DIAGNOSIS — M06.9: ICD-10-CM

## 2021-05-18 PROCEDURE — 23470 RECONSTRUCT SHOULDER JOINT: CPT

## 2021-05-18 PROCEDURE — 87635 SARS-COV-2 COVID-19 AMP PRB: CPT

## 2021-05-18 PROCEDURE — 97166 OT EVAL MOD COMPLEX 45 MIN: CPT

## 2021-05-18 PROCEDURE — 73020 X-RAY EXAM OF SHOULDER: CPT

## 2021-05-18 PROCEDURE — 97162 PT EVAL MOD COMPLEX 30 MIN: CPT

## 2021-05-18 PROCEDURE — 64415 NJX AA&/STRD BRCH PLXS IMG: CPT

## 2021-05-18 PROCEDURE — 76942 ECHO GUIDE FOR BIOPSY: CPT

## 2021-05-18 RX ADMIN — CEFAZOLIN SCH MLS/HR: 330 INJECTION, POWDER, FOR SOLUTION INTRAMUSCULAR; INTRAVENOUS at 17:04

## 2021-05-18 RX ADMIN — CEFAZOLIN SCH: 330 INJECTION, POWDER, FOR SOLUTION INTRAMUSCULAR; INTRAVENOUS at 17:03

## 2021-05-18 NOTE — P.OP
Date of Procedure: 05/18/21


Preoperative Diagnosis: 


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 

and osteoarthritis


Postoperative Diagnosis: 


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 

and osteoarthritis


Procedure(s) Performed: 


Hemiarthroplasty right shoulder


Implants: 


Biomet Davies shoulder humeral resurfacing head, size 3


Anesthesia: ANNALISA


Surgeon: Marvel Olivares


Assistant #1: Isadora Wagoner


Estimated Blood Loss (ml): 30


Pathology: none sent


Condition: stable


Disposition: PACU


Indications for Procedure: 


This is an 80-year-old female with severe pain in her right shoulder due to s

evere osteoarthritis secondary to avascular necrosis and collapse of the humeral

head.  After discussing the surgical nonsurgical treatment options with her at 

length, she wishes to proceed with a ama-resurfacing arthroplasty of her right 

humeral head, possible total shoulder arthroplasty.  Informed consent was 

obtained.


Operative Findings: 


The operative findings are consistent with severe osteoarthritis of the right 

shoulder secondary to avascular necrosis and humeral head collapse of her right 

proximal humerus


Description of Procedure: 


The patient was seen in the preoperative area, consent was reviewed, and 

operative site was marked with a skin marker.  Patient was then brought to the 

operating room and given preoperative antibiotics intravenously.  Patient was 

also given 1 g of Tranexamic acid intravenously.  A general anesthetic was 

administered by the anesthesia department.  The patient was then placed in a 

beachchair position with the bony prominences well-padded and the head secured. 

The shoulder was then prepped and draped in the usual sterile fashion.  A 

universal timeout was then performed, which confirmed the patient's name, 

surgical site, ALLERGIES, and consent.





A standard deltopectoral approach was performed.  The skin and subcutaneous 

tissue was sharply dissected down to the deltoid fascia.  The cephalic vein was 

then identified and retracted medially.  The deltopectoral interval was then 

utilized to expose the subscapularis tendon.  A retractor was then placed under 

the coracobrachialis tendon retracted medially, and the deltoid.  The axillary 

nerve is palpated and protected throughout the procedure.  The subscapularis 

tendon was then released and retracted medially.  The humeral head was then e

xposed easily.  There is significant collapse of the humeral head secondary to 

avascular necrosis.  The glenoid was inspected and found to be intact.





The humeral head was incised with the appropriate sizer, and a guidewire was 

inserted into the center of the humeral head.  Appropriate size reamer was then 

used to ream the humeral head the center drill was then drilled through the 

guidewire for the center peg of the prosthesis.  A trial was then placed the 

shoulder was then reduced.  The shoulder and excellent range of motion and 

stabilit.  The shoulder was redislocated, and the trial was removed.  Final 

implant was then opened and implanted onto the humeral head.  The implant was 

checked for stability, and found to be securely fixated on the proximal humerus.

 The shoulder was then reduced and taken through a full range of motion and 

found to be stable.





Shoulder was then irrigated with pulsatile lavage.  A second dose of 1 g of 

Tranexamic acid was given.





The subscapularis was then repaired with #1 Vicryl.  The deltopectoral interval 

was then closed with #1 Vicryl as well.  The subcutaneous tissues were closed 

with 2-0 Vicryl then 30 strata fix suture.  Exofin glue was placed on the skin. 

A sterile dressing was then applied, the patient was transported to the recovery

room in an arm sling in stable condition.





The assistant UNIQUE David was required due the complexity of surgery and 

the need for a skilled surgical assistant.

## 2021-05-18 NOTE — XR
Limited right shoulder

 

HISTORY: Postop

 

Single frontal view of the right shoulder

 

Patient is status post right shoulder arthroplasty. Anatomic alignment is suspected in this single vi
ew. Bone mineralization is reduced. Right hilar region shows some questionable prominence as does the
 interstitium possibly due to technique.

 

IMPRESSION: Orthopedic follow-up. Consider correlation of findings with chest x-ray

## 2021-05-18 NOTE — P.ANPRN
Procedure Note - Anesthesia





- Nerve Block Performed


  ** Right Interscalene


Time Out Performed: Yes (13:06)


Date of Procedure: 05/18/21


Procedure Start Time: 13:06


Procedure Stop Time: 13:21


Location of Patient: PreOp


Indication: Acute Post-Operative Pain, Requested by Surgeon (Dr Marvel Olivares)


Sedation Type: Sedate with meaningful contact maintained


Preparation: Sterile Prep


Position: Supine


Catheter: None


Needle Types: Pajunk


Needle Gauge: Other (see comment) (22g)


Ultrasound used to visualize needle placement: Yes


Ultrasound used to observe medication spread: Yes


Injectate: 0.5% Ropivacaine (see comment for volume) (20cc)


Blood Aspirated: No


Pain Paresthesia on Injection Noted: No


Resistance on Injection: Normal


Image Stored and Saved: Yes


Events: Uneventful and Well Tolerated

## 2021-05-19 VITALS — HEART RATE: 68 BPM | TEMPERATURE: 97.6 F | SYSTOLIC BLOOD PRESSURE: 130 MMHG | DIASTOLIC BLOOD PRESSURE: 71 MMHG

## 2021-05-19 RX ADMIN — HYDROCODONE BITARTRATE AND ACETAMINOPHEN PRN EACH: 5; 325 TABLET ORAL at 13:42

## 2021-05-19 RX ADMIN — HYDROCODONE BITARTRATE AND ACETAMINOPHEN PRN EACH: 5; 325 TABLET ORAL at 06:00

## 2021-05-19 RX ADMIN — CEFAZOLIN SCH MLS/HR: 330 INJECTION, POWDER, FOR SOLUTION INTRAMUSCULAR; INTRAVENOUS at 05:41

## 2021-05-19 RX ADMIN — CEFAZOLIN SCH: 330 INJECTION, POWDER, FOR SOLUTION INTRAMUSCULAR; INTRAVENOUS at 05:41

## 2021-05-19 RX ADMIN — CEFAZOLIN SCH: 330 INJECTION, POWDER, FOR SOLUTION INTRAMUSCULAR; INTRAVENOUS at 13:39

## 2021-05-19 NOTE — P.CONS
History of Present Illness





- Reason for Consult


Consult date: 05/19/21


Medical management


Requesting physician: Marvel Olivares





- Chief Complaint


Right shoulder pain





- History of Present Illness





Consultation:


This is a pleasant 80-year-old patient, follows with Dr. Strange.  Chronic stable 

medical conditions include pulmonary fibrosis, gallstones, hiatal hernia, 

essential hypertension, rheumatoid arthritis.  Patient has chronic right 

shoulder pain going on for a while.  Which is progressively gotten worse.  No 

prior history of injury.  She has snapping and popping in the shoulder all the 

time.  She is also had physical therapy.  She's had 3 prior cortisone injections

in the shoulder by rheumatologist.  Patient is found to have severe avascular 

necrosis of the right humeral head with significant collapse and osteoarthritis.

 Patient underwent hemiarthroplasty of the same.  Today pain is reasonably 

controlled.  Right arm in a sling.  Able to use her hand.  No nausea vomiting.  

No chest pain or shortness of breath.  Did tolerate her breakfast.





Review of systems:


GEN.: None


EYES: None


HEENT: None


NECK: None


RESPIRATORY: None


CARDIOVASCULAR: None


GASTROINTESTINAL: None


GENITOURINARY: None


MUSCULOSKELETAL: Joint pains


LYMPHATICS: None


HEMATOLOGICAL: None  


PSYCHIATRY: None


NEUROLOGICAL: None





Past medical history to include:


Pulmonary fibrosis, rheumatoid arthritis, hiatal hernia, a symptom medic 

gallstones, essential hypertension





Social history:


Patient stopped smoking 35 years ago.  Smoked on and off for the age of 17 and 

less than a pack a day.  Alcohol rarely.





Family history:


Reviewed, noncontributory to presentation





Physical examination:


VITAL SIGNS: 97.9, 87, 16, 118/61, 97% room air


GENERAL: BMI 28.5, sitting up in a chair, comfortable.


EYES: Pupils equal.  Conjunctiva normal.


HEENT: External appearance of nose and ears normal, oral cavity grossly normal.


NECK: JVD not raised; masses not palpable.


HEART: First and second heart sounds are normal;  no edema.  


LUNGS: Respiratory rate normal; fine basal crackles.  


ABDOMEN: Soft,  nontender, liver spleen not palpable, no masses palpable.  


PSYCH: Alert and oriented x3;  mood  and affect normal. 


MUSCULAR skeletal: Evidence of significant OA especially in the hands.  Right 

arm in a sling.  Dressing over the right shoulder.  


NEUROLOGICAL: Cranial nerves grossly intact; no facial asymmetry,   power and 

sensation grossly intact. 


LYMPHATICS: No lymph nodes palpable in the axilla and neck





INVESTIGATIONS, reviewed in the clinical context:


Labs from 04/26/2021:


WBC 7 hemoglobin 11.3 sodium 129 potassium 4.8 creatinine 0.78


Coronavirus [PCR]-not detected





Assessment and plan:





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 

and osteoarthritis, secondary followed by hemiarthroplasty of the right shoulder


Right dominant sling.  Pain control





-Pulmonary fibrosis, could be from rheumatoid arthritis


Follow clinically





-Moderate hiatal hernia


Continue omeprazole





-GERD


Continue omeprazole





-Chronic rheumatoid arthritis


Continue with Xeljanz





-Essential hypertension


Continue with losartan





Care was discussed with the patient.  Questions answered.  Home medications 

resumed.  Patient is to follow-up with the rheumatologist and Dr. Strange upon 

discharge.


Thank you Dr. Olivares





Past Medical History


Past Medical History: GERD/Reflux, Hypertension, Pneumonia, Rheumatoid Arthritis

 (RA)


Additional Past Medical History / Comment(s): right shoulder pain, hx. frequent 

UTI's, had pneumonia in January & was hospitalized


History of Any Multi-Drug Resistant Organisms: None Reported


Past Surgical History: Back Surgery, Hysterectomy


Additional Past Surgical History / Comment(s): Back surgeries x3-2 were fusions,

 ORIF left ankle


Past Anesthesia/Blood Transfusion Reactions: No Reported Reaction


Smoking Status: Former smoker





- Past Family History


  ** Father


Family Medical History: No Reported History





Medications and Allergies


                                Home Medications











 Medication  Instructions  Recorded  Confirmed  Type


 


Celecoxib [CeleBREX] 400 mg PO DAILY 01/03/21 05/12/21 History


 


Fluticasone Nasal Spray [Flonase 1 spr EA NOSTRIL DAILY 01/03/21 05/18/21 His

tory





Nasal Spray]    


 


Losartan Potassium 100 mg PO DAILY 01/03/21 05/12/21 History


 


Nitrofurantoin Monohyd/M-Cryst 100 mg PO MOWEFR 01/03/21 05/18/21 History





[Macrobid]    


 


Omeprazole 20 mg PO DAILY 01/03/21 05/12/21 History


 


Tofacitinib Citrate [Xeljanz Xr] 11 mg PO DAILY 01/04/21 05/12/21 History


 


Acetaminophen Tab [Tylenol] 650 mg PO Q6HR PRN  tab 01/06/21 05/18/21 Rx


 


HYDROcodone/APAP 5-325MG [Norco 1 tab PO Q6HR PRN #30 tab 05/18/21  Rx





5-325]    


 


Sennosides [Senokot] 2 tab PO DAILY PRN #60 tablet 05/18/21  Rx








                                    Allergies











Allergy/AdvReac Type Severity Reaction Status Date / Time


 


No Known Allergies Allergy   Verified 05/18/21 12:30














Physical Exam


Vitals: 


                                   Vital Signs











  Temp Pulse Pulse Resp BP Pulse Ox


 


 05/19/21 05:00  97.9 F  87   16  118/61  97


 


 05/18/21 20:55  97.8 F  91   16  131/75  94 L


 


 05/18/21 16:11  98.2 F  78   16  166/81  98


 


 05/18/21 15:35   81   16  154/70  99


 


 05/18/21 15:20   83   18  162/70  98


 


 05/18/21 15:05  97.6 F  76   16  154/69  98


 


 05/18/21 13:24    79  16  168/74  100


 


 05/18/21 12:34  98.0 F   84  18  167/78  98








                                Intake and Output











 05/18/21 05/19/21 05/19/21





 22:59 06:59 14:59


 


Intake Total 275 1560 


 


Balance 275 1560 


 


Intake:   


 


    


 


  Intake, IV Titration 100 1200 





  Amount   


 


    Sodium Chloride 0.9% 1, 100 1200 





    000 ml @ 100 mls/hr IV .   





    Q10H [facility] Rx#:[id number]   


 


  Oral  360 


 


Other:   


 


  # Voids 1 1

## 2021-05-19 NOTE — P.DS
Providers


Expected date of discharge: 05/19/21


Attending physician: 


Marvel Olivares





Consults: 





                                        





05/18/21 15:10


Consult Physician Routine 


   Consulting Provider: John Stone


   Consult Reason/Comments: medical management


   Do you want consulting provider notified?: Yes











Primary care physician: 


[facility]: 





This is an 80-year-old female who presented to the office to discuss surgical 

options for right shoulder.  After discussion and consideration the patient 

elects to proceed with hemiarthroplasty right shoulder with Dr. Marvel Olivares. 

The patient is seen preoperatively by Jen RENDON and cleared for 

surgery.





The patient is admitted to [facility] on 5/18/21 for 

hemiarthroplasty right shoulder. She is doing well postoperatively.  Vital signs

are stable.  


The patient is seen and examined bedside this morning.  She is currently up in 

the bedside chair.  She states her pain in the right shoulder is well-

controlled.  She feels well.  She is remaining fairly and she is tolerating her 

diet well.  She has no complaints today.  She feels ready to be discharged to 

rehab.  The patient is able to get up out of bed independently and  is 

ambulating without assistance.  Pain is well controlled. She denies chest pain, 

shortness of breath, nausea, vomiting, fevers, chills.





Patient is discharged to rehab on postoperative day #1 in good condition, pen

ding medical clearance. Please see med rec for accurate list of home 

medications.











Plan - Discharge Summary


Discharge Rx Participant: No


New Discharge Prescriptions: 


New


   HYDROcodone/APAP 5-325MG [Norco 5-325] 1 tab PO Q6HR PRN #30 tab


     PRN Reason: Pain


   Sennosides [Senokot] 2 tab PO DAILY PRN #60 tablet


     PRN Reason: Constipation





No Action


   Nitrofurantoin Monohyd/M-Cryst [Macrobid] 100 mg PO MOWEFR


   Losartan Potassium 100 mg PO DAILY


   Fluticasone Nasal Spray [Flonase Nasal Spray] 1 spr EA NOSTRIL DAILY


   Celecoxib [CeleBREX] 400 mg PO DAILY


   Omeprazole 20 mg PO DAILY


   Tofacitinib Citrate [Xeljanz Xr] 11 mg PO DAILY


   Acetaminophen Tab [Tylenol] 650 mg PO Q6HR PRN  tab


     PRN Reason: Mild Pain Or Fever > 100.5


   HYDROcodone/APAP 5-325MG [Norco 5-325] 1 tab PO Q6HR PRN


     PRN Reason: Pain


Discharge Medication List





Celecoxib [CeleBREX] 400 mg PO DAILY 01/03/21 [History]


Fluticasone Nasal Spray [Flonase Nasal Spray] 1 spr EA NOSTRIL DAILY 01/03/21 

[History]


Losartan Potassium 100 mg PO DAILY 01/03/21 [History]


Nitrofurantoin Monohyd/M-Cryst [Macrobid] 100 mg PO MOWEFR 01/03/21 [History]


Omeprazole 20 mg PO DAILY 01/03/21 [History]


Tofacitinib Citrate [Xeljanz Xr] 11 mg PO DAILY 01/04/21 [History]


Acetaminophen Tab [Tylenol] 650 mg PO Q6HR PRN  tab 01/06/21 [Rx]


HYDROcodone/APAP 5-325MG [Norco 5-325] 1 tab PO Q6HR PRN 05/12/21 [History]


HYDROcodone/APAP 5-325MG [Norco 5-325] 1 tab PO Q6HR PRN #30 tab 05/18/21 [Rx]


Sennosides [Senokot] 2 tab PO DAILY PRN #60 tablet 05/18/21 [Rx]








Follow up Appointment(s)/Referral(s): 


Marvel Olivares DO [Doctor of Osteopathic Medicine] - 2 Weeks


Activity/Diet/Wound Care/Special Instructions: 


Maintain sling for comfort.


Dressing may be removed by nurse or by patient in 7 days. Then change dressing 

twice daily until follow up.


May shower with initial dressing intact and after removal.


If dressing become saturated, please remove.


Avoid any ifting.


Please follow-up with [facility] and call with any questions or co

ncerns, [phone].


Discharge Disposition: TRANSFER TO SNF/ECF

## 2022-02-08 ENCOUNTER — HOSPITAL ENCOUNTER (OUTPATIENT)
Dept: HOSPITAL 47 - LABPAT | Age: 82
Discharge: HOME | End: 2022-02-08
Attending: ORTHOPAEDIC SURGERY
Payer: MEDICARE

## 2022-02-08 DIAGNOSIS — Z01.812: Primary | ICD-10-CM

## 2022-02-08 LAB
ALBUMIN SERPL-MCNC: 4.2 G/DL (ref 3.8–4.9)
ALBUMIN/GLOB SERPL: 1.37 G/DL (ref 1.6–3.17)
ALP SERPL-CCNC: 117 U/L (ref 41–126)
ALT SERPL-CCNC: 12 U/L (ref 8–44)
ANION GAP SERPL CALC-SCNC: 11.2 MMOL/L (ref 10–18)
APTT BLD: 25.3 SEC (ref 22–30)
AST SERPL-CCNC: 20 U/L (ref 13–35)
BUN SERPL-SCNC: 15.5 MG/DL (ref 9–27)
BUN/CREAT SERPL: 22.93 RATIO (ref 12–20)
CALCIUM SPEC-MCNC: 10.1 MG/DL (ref 8.7–10.3)
CHLORIDE SERPL-SCNC: 97 MMOL/L (ref 96–109)
CO2 SERPL-SCNC: 24.2 MMOL/L (ref 20–27.5)
ERYTHROCYTE [DISTWIDTH] IN BLOOD BY AUTOMATED COUNT: 4.04 X 10*6/UL (ref 4.1–5.2)
ERYTHROCYTE [DISTWIDTH] IN BLOOD: 13 % (ref 11.5–14.5)
GLOBULIN SER CALC-MCNC: 3.1 G/DL (ref 1.6–3.3)
GLUCOSE SERPL-MCNC: 89 MG/DL (ref 70–110)
HCT VFR BLD AUTO: 38.1 % (ref 37.2–46.3)
HGB BLD-MCNC: 12.1 G/DL (ref 12–15)
HYALINE CASTS UR QL AUTO: 3 /LPF (ref 0–2)
INR PPP: 1 (ref ?–1.2)
MCH RBC QN AUTO: 30 PG (ref 27–32)
MCHC RBC AUTO-ENTMCNC: 31.8 G/DL (ref 32–37)
MCV RBC AUTO: 94.3 FL (ref 80–97)
NRBC BLD AUTO-RTO: 0 /100 WBCS (ref 0–0)
PH UR: 6 [PH] (ref 5–8)
PLATELET # BLD AUTO: 315 X 10*3/UL (ref 140–440)
POTASSIUM SERPL-SCNC: 4.4 MMOL/L (ref 3.5–5.5)
PROT SERPL-MCNC: 7.3 G/DL (ref 6.2–8.2)
PT BLD: 10.7 SEC (ref 9–12)
RBC UR QL: 1 /HPF (ref 0–5)
SODIUM SERPL-SCNC: 133 MMOL/L (ref 135–145)
SP GR UR: 1.02 (ref 1–1.03)
SQUAMOUS UR QL AUTO: 4 /HPF (ref 0–4)
UROBILINOGEN UR QL STRIP: <2 MG/DL (ref ?–2)
WBC # BLD AUTO: 6.79 X 10*3/UL (ref 4.5–10)
WBC #/AREA URNS HPF: 2 /HPF (ref 0–5)

## 2022-02-08 PROCEDURE — 85730 THROMBOPLASTIN TIME PARTIAL: CPT

## 2022-02-08 PROCEDURE — 87070 CULTURE OTHR SPECIMN AEROBIC: CPT

## 2022-02-08 PROCEDURE — 85027 COMPLETE CBC AUTOMATED: CPT

## 2022-02-08 PROCEDURE — 85610 PROTHROMBIN TIME: CPT

## 2022-02-08 PROCEDURE — 81001 URINALYSIS AUTO W/SCOPE: CPT

## 2022-02-08 PROCEDURE — 80053 COMPREHEN METABOLIC PANEL: CPT

## 2022-02-22 ENCOUNTER — HOSPITAL ENCOUNTER (OUTPATIENT)
Dept: HOSPITAL 47 - OR | Age: 82
LOS: 1 days | Discharge: NURSING, REHAB OR LONG-TERM CARE | End: 2022-02-23
Attending: ORTHOPAEDIC SURGERY
Payer: MEDICARE

## 2022-02-22 DIAGNOSIS — Z97.2: ICD-10-CM

## 2022-02-22 DIAGNOSIS — Z83.3: ICD-10-CM

## 2022-02-22 DIAGNOSIS — H91.90: ICD-10-CM

## 2022-02-22 DIAGNOSIS — M06.9: ICD-10-CM

## 2022-02-22 DIAGNOSIS — R26.81: ICD-10-CM

## 2022-02-22 DIAGNOSIS — Z82.49: ICD-10-CM

## 2022-02-22 DIAGNOSIS — Z90.710: ICD-10-CM

## 2022-02-22 DIAGNOSIS — Z79.899: ICD-10-CM

## 2022-02-22 DIAGNOSIS — Z88.2: ICD-10-CM

## 2022-02-22 DIAGNOSIS — M16.11: Primary | ICD-10-CM

## 2022-02-22 DIAGNOSIS — Z87.891: ICD-10-CM

## 2022-02-22 DIAGNOSIS — Z87.11: ICD-10-CM

## 2022-02-22 DIAGNOSIS — Z97.3: ICD-10-CM

## 2022-02-22 DIAGNOSIS — Z98.890: ICD-10-CM

## 2022-02-22 DIAGNOSIS — K30: ICD-10-CM

## 2022-02-22 DIAGNOSIS — K21.9: ICD-10-CM

## 2022-02-22 DIAGNOSIS — I10: ICD-10-CM

## 2022-02-22 LAB
ANION GAP SERPL CALC-SCNC: 8 MMOL/L
BUN SERPL-SCNC: 13 MG/DL (ref 7–17)
CALCIUM SPEC-MCNC: 10.7 MG/DL (ref 8.4–10.2)
CHLORIDE SERPL-SCNC: 97 MMOL/L (ref 98–107)
CO2 SERPL-SCNC: 26 MMOL/L (ref 22–30)
GLUCOSE BLD-MCNC: 138 MG/DL (ref 75–99)
GLUCOSE SERPL-MCNC: 105 MG/DL (ref 74–99)
POTASSIUM SERPL-SCNC: 4.5 MMOL/L (ref 3.5–5.1)
SODIUM SERPL-SCNC: 131 MMOL/L (ref 137–145)

## 2022-02-22 PROCEDURE — 97535 SELF CARE MNGMENT TRAINING: CPT

## 2022-02-22 PROCEDURE — 73501 X-RAY EXAM HIP UNI 1 VIEW: CPT

## 2022-02-22 PROCEDURE — 87635 SARS-COV-2 COVID-19 AMP PRB: CPT

## 2022-02-22 PROCEDURE — 88300 SURGICAL PATH GROSS: CPT

## 2022-02-22 PROCEDURE — 80048 BASIC METABOLIC PNL TOTAL CA: CPT

## 2022-02-22 PROCEDURE — 86850 RBC ANTIBODY SCREEN: CPT

## 2022-02-22 PROCEDURE — 27130 TOTAL HIP ARTHROPLASTY: CPT

## 2022-02-22 PROCEDURE — 86901 BLOOD TYPING SEROLOGIC RH(D): CPT

## 2022-02-22 PROCEDURE — 97166 OT EVAL MOD COMPLEX 45 MIN: CPT

## 2022-02-22 PROCEDURE — 85025 COMPLETE CBC W/AUTO DIFF WBC: CPT

## 2022-02-22 PROCEDURE — 97161 PT EVAL LOW COMPLEX 20 MIN: CPT

## 2022-02-22 PROCEDURE — 86900 BLOOD TYPING SEROLOGIC ABO: CPT

## 2022-02-22 RX ADMIN — HYDROMORPHONE HYDROCHLORIDE PRN MG: 1 INJECTION, SOLUTION INTRAMUSCULAR; INTRAVENOUS; SUBCUTANEOUS at 12:00

## 2022-02-22 RX ADMIN — ONDANSETRON ONE: 2 INJECTION INTRAMUSCULAR; INTRAVENOUS at 17:01

## 2022-02-22 RX ADMIN — ONDANSETRON ONE MG: 2 INJECTION INTRAMUSCULAR; INTRAVENOUS at 08:44

## 2022-02-22 RX ADMIN — DEXAMETHASONE SODIUM PHOSPHATE ONE MG: 4 INJECTION, SOLUTION INTRAMUSCULAR; INTRAVENOUS at 08:44

## 2022-02-22 RX ADMIN — DEXAMETHASONE SODIUM PHOSPHATE ONE: 4 INJECTION, SOLUTION INTRAMUSCULAR; INTRAVENOUS at 17:00

## 2022-02-22 RX ADMIN — ASPIRIN 325 MG ORAL TABLET SCH MG: 325 PILL ORAL at 19:38

## 2022-02-22 RX ADMIN — HYDROCODONE BITARTRATE AND ACETAMINOPHEN PRN EACH: 7.5; 325 TABLET ORAL at 17:44

## 2022-02-22 RX ADMIN — HYDROMORPHONE HYDROCHLORIDE PRN MG: 1 INJECTION, SOLUTION INTRAMUSCULAR; INTRAVENOUS; SUBCUTANEOUS at 11:05

## 2022-02-22 RX ADMIN — HYDROMORPHONE HYDROCHLORIDE PRN MG: 1 INJECTION, SOLUTION INTRAMUSCULAR; INTRAVENOUS; SUBCUTANEOUS at 15:30

## 2022-02-22 RX ADMIN — CEFAZOLIN SCH MLS/HR: 330 INJECTION, POWDER, FOR SOLUTION INTRAMUSCULAR; INTRAVENOUS at 17:34

## 2022-02-22 RX ADMIN — POTASSIUM CHLORIDE SCH MLS: 14.9 INJECTION, SOLUTION INTRAVENOUS at 08:38

## 2022-02-22 RX ADMIN — POTASSIUM CHLORIDE SCH MLS: 14.9 INJECTION, SOLUTION INTRAVENOUS at 09:13

## 2022-02-22 NOTE — P.OP
Date of Procedure: 02/22/22


Preoperative Diagnosis: 


Severe osteoarthritis right hip


Postoperative Diagnosis: 


Severe osteoarthritis right hip


Procedure(s) Performed: 


Right total hip arthroplasty with a direct anterior approach


Implants: 


[employer]hew Polarstem standard size 4 with a collar


[employer] R3, 3 hole hemispherical acetabular shell, 52 mm


[employer]hew Reflection 6.5 mm cancellus screw, 20 mm 2


[employer]hew R3, XLPE 20 acetabular liner 


[employer] Nephew Oxinium femoral head 36 m, +0


All components were press-fit.


The articulation is Oxinium on polyethylene.


Anesthesia: GETA


Surgeon: Marvel Olivares


Assistant #1: Isadora Wagoner


Estimated Blood Loss (ml): 150


Pathology: other (Femoral head)


Condition: stable


Disposition: PACU


Indications for Procedure: 


After failure of conservative treatment we discussed the surgical and 

nonsurgical treatment options at length.  Patient wishes to proceed with a total

hip arthroplasty with a direct anterior approach.  Complications specific to 

this procedure were discussed at length, including but not limited to infection,

leg length discrepancy, dislocation, nerve injury, and fracture.  Covid-19 was 

also discussed at length with the patient, and they are aware of the current po

licies and procedures.  The patient was given the option of delaying surgery, 

but they elect to proceed knowing these risks.  Patient is aware of all these 

complications and informed consent was obtained


Operative Findings: 


The operative findings are consistent with severe osteoarthritis of the right 

hip


Description of Procedure: 


Patient was seen and evaluated in the preoperative area and the consent was 

reviewed.  The operative site was marked with a skin marker.  The patient was 

then brought to the operating room and given preoperative antibiotics 

intravenously.  1 g of Tranexamic acid was also given intravenously.  A general 

anesthetic was administered by the anesthesia department.   The patient was then

placed on the [place] table with the bony prominences well-padded.  The hip area 

was then prepped with a ChloraPrep solution and draped in the usual sterile 

fashion.





A universal timeout was then performed, which confirmed the patient's name, 

surgical site, ALLERGIES, and procedure being performed on the consent.  Next 

the incision site was located at 1 cm distal and 2 cm lateral to the anterior 

superior iliac spine.  The skin and subcutaneous tissues were sharply incised.  

Incision was carefully dissected down to the fascia overlying the tensor fascia 

renard muscle.  This fascia was then incised in line with the incision.  Care was 

taken to stay laterally in order to avoid injuring the lateral femoral cutaneous

nerve.  Next, using blunt finger dissection, the tensor fascia renard muscle was 

dissected off its investing fascia.  The muscle was then carefully retracted 

laterally with a cobra retractor over the lateral neck of the femur.  Next, the 

circumflex vessels were identified and cauterized using the AquaMantis device.  

The anterior hip capsule was then exposed.  The capsule was then opened and an 

inverted T fashion.  Cobra retractors were then placed intracapsularly.  The 

retractors were maintained intracapsular throughout the procedure.  The proximal

femur was then visualized.  Fluoroscopic x-rays were then taken in order to 

evaluate the preoperative leg lengths.  A small amount of traction was placed on

the leg.





The femoral neck was then osteotomized at the appropriate level above the lesser

trochanter.  A small wedge of bone was then removed from the remaining femoral 

head.  Next, using a corkscrew the femoral head was removed from the acetabulum.

 On gross visual inspection, the femoral head had complete loss of articular 

cartilage and multiple periarticular osteophytes.  The femoral head was then 

measured.  Attention was then turned to the acetabulum.





The acetabulum was exposed and any remaining labrum was excised.  Sequential 

reaming of the acetabulum was performed using fluoroscopic guidance until there 

was a good bed of bleeding cancellus bone.  When the appropriate size was 

reached, a trial was then placed.  The position and fit of the trial was checked

with fluoroscopy.  The trial was then removed.  Then, using fluoroscopic 

guidance, the final implant was impacted at 20 of anteversion and 40 of 

abduction, and fully seated in the acetabulum.  2 screws were then placed in the

acetabulum.  Again fluoroscopy was used to check position of the screws.  Next, 

the liner was then impacted, with a 20 elevated liner located in the anterior 

superior quadrant.  Component locking was confirmed.





Attention was then directed to the femur.  With the aid of the [place] table, the 

femur was externally rotated to approximately 130, extended, and adducted under

the opposite leg.  A side hook was then placed under the proximal femur, and the

side hook elevator was used to elevate the proximal femur while releasing the 

capsule.  Retractors were then placed.  A capsular release was performed, as 

well as a release of the conjoined tendon, which afforded excellent 

visualization of the proximal femur.  Next, a box osteotome was used to 

lateralize the proximal femur.  A  was then used to locate the 

femoral canal.  Sequential broaching was then performed with appropriate size 

which afforded excellent fixation in the proximal femur.  A trial was then 

placed with appropriate head and neck, and the hip was gently reduced with the 

aid of the [place] table.  Fluoroscopy was then used to check position of the 

components, as well as to ensure equal leg lengths.  The hip was then gently 

dislocated and the trials were then removed.  Final implants were then impacted 

and the hip was again reduced.  Final fluoroscopic x-rays confirmed that the 

components were in anatomic position, as well as equal leg lengths.  The hip was

also taken through range of motion, and found to be stable.





The hip was then copiously irrigated with antibiotic solution with pulsatile 

lavage.  The hip was then irrigated with Irrisept solution.  The soft tissues 

were then injected with a ropivacaine solution.  A second dose of 1 g of 

Tranexamic acid was also given intravenously.  Any blood collected by [facility] [employer]

was then returned to the patient at this time.





The fascia was then closed with 2-0 strata fix suture.  The subcutaneous tissue 

was closed with 3-0 Vicryl.  The skin was closed with 4-0 nylon suture.  The 

skin was then closed with Exofin skin glue.  After the glue and dried, and 

Optifoam silver impregnated dressing was applied.  The patient was then 

transferred to the recovery room in stable condition.





The assistant  UNIQUE David was required due to the complexity of surgery, 

and the need for skilled surgical assistant for positioning, draping, exposure, 

retraction, and closure of the wound.

## 2022-02-22 NOTE — FL
Fluoroscopy

 

HISTORY: Hip replacement

 

46 seconds fluoroscopy time supplied to the referring clinician.  2 intraoperative C-arm images docum
ent the procedure. See dictated report from orthopedic surgery.

## 2022-02-22 NOTE — XR
Limited right hip

 

HISTORY: Status post right hip arthroplasty

 

Single frontal view of the right hip

 

Patient is status post right hip arthroplasty, there is anatomic alignment. Lucency is present within
 the soft tissues.

 

IMPRESSION: Orthopedic follow-up.

## 2022-02-23 VITALS — HEART RATE: 70 BPM | SYSTOLIC BLOOD PRESSURE: 145 MMHG | DIASTOLIC BLOOD PRESSURE: 62 MMHG

## 2022-02-23 VITALS — RESPIRATION RATE: 16 BRPM

## 2022-02-23 VITALS — TEMPERATURE: 98.8 F

## 2022-02-23 LAB
BASOPHILS # BLD AUTO: 0.01 X 10*3/UL (ref 0–0.1)
BASOPHILS NFR BLD AUTO: 0.1 %
EOSINOPHIL # BLD AUTO: 0 X 10*3/UL (ref 0.04–0.35)
EOSINOPHIL NFR BLD AUTO: 0 %
ERYTHROCYTE [DISTWIDTH] IN BLOOD BY AUTOMATED COUNT: 3.36 X 10*6/UL (ref 4.1–5.2)
ERYTHROCYTE [DISTWIDTH] IN BLOOD: 13.2 % (ref 11.5–14.5)
HCT VFR BLD AUTO: 32.8 % (ref 37.2–46.3)
HGB BLD-MCNC: 10.1 G/DL (ref 12–15)
IMM GRANULOCYTES BLD QL AUTO: 1 %
LYMPHOCYTES # SPEC AUTO: 0.73 X 10*3/UL (ref 0.9–5)
LYMPHOCYTES NFR SPEC AUTO: 7 %
MCH RBC QN AUTO: 30.1 PG (ref 27–32)
MCHC RBC AUTO-ENTMCNC: 30.8 G/DL (ref 32–37)
MCV RBC AUTO: 97.6 FL (ref 80–97)
MONOCYTES # BLD AUTO: 1.06 X 10*3/UL (ref 0.2–1)
MONOCYTES NFR BLD AUTO: 10.1 %
NEUTROPHILS # BLD AUTO: 8.55 X 10*3/UL (ref 1.8–7.7)
NEUTROPHILS NFR BLD AUTO: 81.8 %
NRBC BLD AUTO-RTO: 0 /100 WBCS (ref 0–0)
PLATELET # BLD AUTO: 430 X 10*3/UL (ref 140–440)
WBC # BLD AUTO: 10.45 X 10*3/UL (ref 4.5–10)

## 2022-02-23 RX ADMIN — ASPIRIN 325 MG ORAL TABLET SCH MG: 325 PILL ORAL at 07:59

## 2022-02-23 RX ADMIN — HYDROCODONE BITARTRATE AND ACETAMINOPHEN PRN EACH: 7.5; 325 TABLET ORAL at 05:30

## 2022-02-23 RX ADMIN — CEFAZOLIN SCH MLS/HR: 330 INJECTION, POWDER, FOR SOLUTION INTRAMUSCULAR; INTRAVENOUS at 12:45

## 2022-02-23 RX ADMIN — CEFAZOLIN SCH MLS/HR: 330 INJECTION, POWDER, FOR SOLUTION INTRAMUSCULAR; INTRAVENOUS at 12:24

## 2022-02-23 NOTE — P.DS
Providers


Expected date of discharge: 02/23/22


Attending physician: 


Marvel Olivares





Consults: 





                                        





02/22/22 08:48


Consult Physician Routine 


   Consulting Provider: Shagufta Emery


   Consult Reason/Comments: medical management


   Do you want consulting provider notified?: Yes











Primary care physician: 


Jem Strange








- Discharge Diagnosis(es)


(1) Osteoarthritis of right hip


Current Visit: Yes   Status: Acute   





(2) S/P total right hip arthroplasty


Current Visit: Yes   Status: Acute   


Hospital Course: 


This is an 81-year-old female with known history of degenerative arthritis of 

the right hip.  The patient presents for evaluation.  After discussion and 

consideration patient elects to proceed with total  hip arthroplasty with direct

anterior approach.


The patient is seen preoperatively by primary care physician and cleared for 

surgery.





Patient is admitted to [facility] on 02/22/2022 for total hip 

arthroplasty with direct anterior approach.  The procedure is performed without 

complication or sequelae.  The patient is doing well postoperatively.  Labs and 

vital signs are stable on day of discharge.





On day of discharge patient's hip incision is healing well.  There is minimal 

erythema.  There is no drainage noted at this time.  There is minimal soft 

tissue swelling to the hip and thigh.  Patient has full foot and ankle motion 

without difficulty or pain.  Neurovascular status to the lower extremity is 

intact.





Patient is discharged to inpatient rehab in good condition.  Please see med rec 

for accurate list of home medications.








Plan - Discharge Summary


Discharge Rx Participant: No


New Discharge Prescriptions: 


New


   Aspirin 325 mg PO BID #60 tab


   HYDROcodone/APAP 7.5-325MG [Norco 7.5-325] 1 - 2 tab PO Q6H PRN #32 tab


     PRN Reason: Pain


   Sennosides [Senokot] 2 tab PO DAILY PRN #60 tablet


     PRN Reason: Constipation





No Action


   Nitrofurantoin Monohyd/M-Cryst [Macrobid] 100 mg PO MOWEFR


   Losartan Potassium 100 mg PO QAM


   Fluticasone Nasal Spray [Flonase Nasal Spray] 1 spr EA NOSTRIL DAILY


   Celecoxib [CeleBREX] 400 mg PO DAILY


   Omeprazole 20 mg PO QAM


   Tofacitinib Citrate [Xeljanz Xr] 11 mg PO QAM


   Acetaminophen Tab [Tylenol] 650 mg PO Q6HR PRN  tab


     PRN Reason: Mild Pain Or Fever > 100.5


   Hempvana 1 applicate TOPICAL AS DIRECTED PRN


     PRN Reason: ARTHRITIC PAIN


Discharge Medication List





Celecoxib [CeleBREX] 400 mg PO DAILY 01/03/21 [History]


Fluticasone Nasal Spray [Flonase Nasal Spray] 1 spr EA NOSTRIL DAILY 01/03/21 

[History]


Losartan Potassium 100 mg PO QAM 01/03/21 [History]


Nitrofurantoin Monohyd/M-Cryst [Macrobid] 100 mg PO MOWEFR 01/03/21 [History]


Omeprazole 20 mg PO QAM 01/03/21 [History]


Tofacitinib Citrate [Xeljanz Xr] 11 mg PO QAM 01/04/21 [History]


Acetaminophen Tab [Tylenol] 650 mg PO Q6HR PRN  tab 01/06/21 [Rx]


Hempvana 1 applicate TOPICAL AS DIRECTED PRN 02/18/22 [History]


Aspirin 325 mg PO BID #60 tab 02/22/22 [Rx]


HYDROcodone/APAP 7.5-325MG [Norco 7.5-325] 1 - 2 tab PO Q6H PRN #32 tab 02/22/22

[Rx]


Sennosides [Senokot] 2 tab PO DAILY PRN #60 tablet 02/22/22 [Rx]








Follow up Appointment(s)/Referral(s): 


Marvel Olivares DO [Doctor of Osteopathic Medicine] - 2 Weeks


Activity/Diet/Wound Care/Special Instructions: 


Weightbearing as tolerated with walker.


Leave dressing intact.  Dressing may be removed by home care nurse or by patient

in 7 days. Then change dressing twice daily until follow up.


May shower with initial dressing intact and after removal.


If dressing become saturated, please remove.


Please take aspirin 325mg twice daily for 30 days to prevent blood clots.


Recommend use of compression stockings daily until follow up to help prevent 

swelling and blood clots. May remove at night before sleeping. 


Please follow-up with [facility] in 2 weeks and call with any 

questions or concerns, [phone].





Discharge Disposition: TRANSFER TO SNF/F

## 2022-02-23 NOTE — P.CONS
History of Present Illness





- Reason for Consult


Consult date: 02/23/22


Medical management


Requesting physician: Marvel Olivares





- Chief Complaint


Right hip surgery





- History of Present Illness





This is a pleasant 81-year-old patient, follows with Dr. Strange.  Chronic stable 

medical conditions include hypertension, rheumatoid arthritis, chronic gait 

dysfunction uses a walker.


Patient underwent right total hip arthroplasty yesterday.  Pain is controlled.  

No nausea vomiting.  Did tolerate her breakfast.  Did walk with a walker.  No 

chest pain or shortness of breath.





Review of systems:


GEN.: None


EYES: None


HEENT: None


NECK: None


RESPIRATORY: None


CARDIOVASCULAR: None


GASTROINTESTINAL: None


GENITOURINARY: None


MUSCULOSKELETAL: Joint pains


LYMPHATICS: None


HEMATOLOGICAL: None  


PSYCHIATRY: None


NEUROLOGICAL: [Uses a walker





Past medical history to include:


Hypertension, rheumatoid arthritis, uses a walker





Social history:


Patient stopped smoking 30 years ago.  No alcohol.  Lives alone.





Family history:


Reviewed, noncontributory to presentation





Physical examination:


VITAL SIGNS: 98.4, 84, 16, 150/66, 98% room air


GENERAL: BMI 28, sitting on chair, awake, comfortable.


EYES: Pupils equal.  Conjunctiva normal.


HEENT: External appearance of nose and ears normal, oral cavity grossly normal.


NECK: JVD not raised; masses not palpable.


HEART: First and second heart sounds are normal;  no edema.  


LUNGS: Respiratory rate normal; clear to auscultation.  


ABDOMEN: Soft,  nontender, liver spleen not palpable, no masses palpable.  


PSYCH: Alert and oriented x3;  mood  and affect normal.  


MUSCULOSKELETAL:No Clubbing/cyanosis;muscles-grossly intact.  Dressing over the 

right hip.  Evidence of RA especially the hands.


NEUROLOGICAL: Cranial nerves grossly intact; no facial asymmetry,   power and 

sensation grossly intact. 


LYMPHATICS: No lymph nodes palpable in the axilla and neck





INVESTIGATIONS, reviewed in the clinical context:


White count 10.4 hemoglobin 10.1 platelets 4:30 sodium 131 potassium 4.5 

creatinine 0.6


Previous labs:


Hemoglobin 12.1 on 02/08/2022





Assessment and plan:





-Right total hip arthroplasty


Aspirin for DVT prophylaxis





-Chronic rheumatoid arthritis


On Celebrex,xeljanz XR





-Chronic UTI


Prophylactic nitrofurantoin 100 mg Monday Wednesday Friday





-GERD


Omeprazole 20 mg a day





-Essential hypertension


Losartan 100 mg a day





Home medications resumed.  Care discussed with the patient.  Patient to follow-

up with his family doctor per discharge.  Questions answered


Thank you Dr. Olivares





Past Medical History


Past Medical History: Hypertension, Pneumonia, Rheumatoid Arthritis (RA)


Additional Past Medical History / Comment(s): right hip pain. USES WALKER


History of Any Multi-Drug Resistant Organisms: None Reported


Past Surgical History: Back Surgery, Hysterectomy, Orthopedic Surgery, Tonsil

lectomy


Additional Past Surgical History / Comment(s): 5/18/21 HEMIARTHROPLASTY OF RIGHT

 SHOULDER.  3 X Back surgeries, ankle surgery. ASAF CATARACT WITH LENS


Past Anesthesia/Blood Transfusion Reactions: No Reported Reaction


Past Psychological History: No Psychological Hx Reported


Smoking Status: Former smoker


Past Alcohol Use History: None Reported


Additional Past Alcohol Use History / Comment(s): quit 30 years ago.


Past Drug Use History: None Reported





- Past Family History


  ** Father


Family Medical History: No Reported History





Medications and Allergies


                                Home Medications











 Medication  Instructions  Recorded  Confirmed  Type


 


Celecoxib [CeleBREX] 400 mg PO DAILY 01/03/21 02/22/22 History


 


Fluticasone Nasal Spray [Flonase 1 spr EA NOSTRIL DAILY 01/03/21 02/22/22 

History





Nasal Spray]    


 


Losartan Potassium 100 mg PO QAM 01/03/21 02/22/22 History


 


Nitrofurantoin Monohyd/M-Cryst 100 mg PO MOWEFR 01/03/21 02/22/22 History





[Macrobid]    


 


Omeprazole 20 mg PO QAM 01/03/21 02/22/22 History


 


Tofacitinib Citrate [Xeljanz Xr] 11 mg PO QAM 01/04/21 02/22/22 History


 


Acetaminophen Tab [Tylenol] 650 mg PO Q6HR PRN  tab 01/06/21 02/22/22 Rx


 


Hempvana 1 applicate TOPICAL AS DIRECTED PRN 02/18/22 02/22/22 History


 


Aspirin 325 mg PO BID #60 tab 02/22/22  Rx


 


HYDROcodone/APAP 7.5-325MG [Norco 1 - 2 tab PO Q6H PRN #32 tab 02/22/22  Rx





7.5-325]    


 


Sennosides [Senokot] 2 tab PO DAILY PRN #60 tablet 02/22/22  Rx








                                    Allergies











Allergy/AdvReac Type Severity Reaction Status Date / Time


 


No Known Allergies Allergy   Verified 02/22/22 08:49














Physical Exam


Vitals: 


                                   Vital Signs











  Temp Pulse Resp BP Pulse Ox


 


 02/23/22 15:36   70   145/62 


 


 02/23/22 14:00  98.8 F  96  16  171/70  99


 


 02/23/22 07:30  98.4 F  84  16  150/66  98


 


 02/23/22 01:44  98.0 F  81  15  115/66  97








                                Intake and Output











 02/23/22 02/23/22 02/23/22





 06:59 14:59 22:59


 


Other:   


 


  # Voids 3 1 


 


  # Bowel Movements 0  














Results


CBC & Chem 7: 


                                 02/23/22 03:49





                                 02/22/22 08:38


Labs: 


                  Abnormal Lab Results - Last 24 Hours (Table)











  02/23/22 Range/Units





  03:49 


 


WBC  10.45 H  (4.50-10.00)  X 10*3/uL


 


RBC  3.36 L  (4.10-5.20)  X 10*6/uL


 


Hgb  10.1 L  (12.0-15.0)  g/dL


 


Hct  32.8 L  (37.2-46.3)  %


 


MCV  97.6 H  (80.0-97.0)  fL


 


MCHC  30.8 L  (32.0-37.0)  g/dL


 


Immature Gran #  0.10 H  (0.00-0.04)  X 10*3/uL


 


Neutrophils #  8.55 H  (1.80-7.70)  X 10*3/uL


 


Lymphocytes #  0.73 L  (0.90-5.00)  X 10*3/uL


 


Monocytes #  1.06 H  (0.20-1.00)  X 10*3/uL


 


Eosinophils #  0 L  (0.04-0.35)  X 10*3/uL

## 2022-11-10 ENCOUNTER — HOSPITAL ENCOUNTER (OUTPATIENT)
Dept: HOSPITAL 47 - LABPAT | Age: 82
Discharge: HOME | End: 2022-11-10
Attending: ORTHOPAEDIC SURGERY
Payer: MEDICARE

## 2022-11-10 DIAGNOSIS — M19.012: ICD-10-CM

## 2022-11-10 DIAGNOSIS — Z01.812: Primary | ICD-10-CM

## 2022-11-10 LAB
ALBUMIN SERPL-MCNC: 4.2 G/DL (ref 3.8–4.9)
ALBUMIN/GLOB SERPL: 1.77 G/DL (ref 1.6–3.17)
ALP SERPL-CCNC: 121 U/L (ref 41–126)
ALT SERPL-CCNC: 13 U/L (ref 8–44)
ANION GAP SERPL CALC-SCNC: 10.9 MMOL/L (ref 10–18)
APTT BLD: 24 SEC (ref 22–30)
AST SERPL-CCNC: 17 U/L (ref 13–35)
BUN SERPL-SCNC: 19.4 MG/DL (ref 9–27)
BUN/CREAT SERPL: 23.07 RATIO (ref 12–20)
CALCIUM SPEC-MCNC: 9.8 MG/DL (ref 8.7–10.3)
CHLORIDE SERPL-SCNC: 91 MMOL/L (ref 96–109)
CO2 SERPL-SCNC: 22.9 MMOL/L (ref 20–27.5)
ERYTHROCYTE [DISTWIDTH] IN BLOOD BY AUTOMATED COUNT: 4.02 X 10*6/UL (ref 4.1–5.2)
ERYTHROCYTE [DISTWIDTH] IN BLOOD: 14.4 % (ref 11.5–14.5)
GLOBULIN SER CALC-MCNC: 2.4 G/DL (ref 1.6–3.3)
GLUCOSE SERPL-MCNC: 87 MG/DL (ref 70–110)
HCT VFR BLD AUTO: 36.8 % (ref 37.2–46.3)
HGB BLD-MCNC: 11.8 G/DL (ref 12–15)
INR PPP: 1 (ref ?–1.2)
MCH RBC QN AUTO: 29.4 PG (ref 27–32)
MCHC RBC AUTO-ENTMCNC: 32.1 G/DL (ref 32–37)
MCV RBC AUTO: 91.5 FL (ref 80–97)
NRBC BLD AUTO-RTO: 0 /100 WBCS (ref 0–0)
PLATELET # BLD AUTO: 365 X 10*3/UL (ref 140–440)
POTASSIUM SERPL-SCNC: 4.6 MMOL/L (ref 3.5–5.5)
PROT SERPL-MCNC: 6.6 G/DL (ref 6.2–8.2)
PT BLD: 10.6 SEC (ref 9–12)
SODIUM SERPL-SCNC: 125 MMOL/L (ref 135–145)
WBC # BLD AUTO: 8.65 X 10*3/UL (ref 4.5–10)

## 2022-11-10 PROCEDURE — 85027 COMPLETE CBC AUTOMATED: CPT

## 2022-11-10 PROCEDURE — 80053 COMPREHEN METABOLIC PANEL: CPT

## 2022-11-10 PROCEDURE — 85730 THROMBOPLASTIN TIME PARTIAL: CPT

## 2022-11-10 PROCEDURE — 93005 ELECTROCARDIOGRAM TRACING: CPT

## 2022-11-10 PROCEDURE — 87070 CULTURE OTHR SPECIMN AEROBIC: CPT

## 2022-11-10 PROCEDURE — 85610 PROTHROMBIN TIME: CPT

## 2022-11-10 PROCEDURE — 81001 URINALYSIS AUTO W/SCOPE: CPT

## 2022-11-11 LAB
BACTERIA UR QL AUTO: (no result) /HPF
PH UR: 5 [PH] (ref 5–8)
SP GR UR: 1.03 (ref 1–1.03)
UROBILINOGEN UR QL: 0.2

## 2022-12-06 ENCOUNTER — HOSPITAL ENCOUNTER (OUTPATIENT)
Dept: HOSPITAL 47 - OR | Age: 82
Setting detail: OBSERVATION
LOS: 2 days | Discharge: NURSING, REHAB OR LONG-TERM CARE | End: 2022-12-08
Attending: ORTHOPAEDIC SURGERY | Admitting: ORTHOPAEDIC SURGERY
Payer: MEDICARE

## 2022-12-06 DIAGNOSIS — M06.9: ICD-10-CM

## 2022-12-06 DIAGNOSIS — Z90.710: ICD-10-CM

## 2022-12-06 DIAGNOSIS — Z79.891: ICD-10-CM

## 2022-12-06 DIAGNOSIS — Z87.891: ICD-10-CM

## 2022-12-06 DIAGNOSIS — H91.90: ICD-10-CM

## 2022-12-06 DIAGNOSIS — I10: ICD-10-CM

## 2022-12-06 DIAGNOSIS — Z98.890: ICD-10-CM

## 2022-12-06 DIAGNOSIS — Z82.49: ICD-10-CM

## 2022-12-06 DIAGNOSIS — Z90.49: ICD-10-CM

## 2022-12-06 DIAGNOSIS — K21.9: ICD-10-CM

## 2022-12-06 DIAGNOSIS — Z79.899: ICD-10-CM

## 2022-12-06 DIAGNOSIS — Z97.2: ICD-10-CM

## 2022-12-06 DIAGNOSIS — M19.012: Primary | ICD-10-CM

## 2022-12-06 DIAGNOSIS — K30: ICD-10-CM

## 2022-12-06 DIAGNOSIS — R26.81: ICD-10-CM

## 2022-12-06 LAB
ANION GAP SERPL CALC-SCNC: 6 MMOL/L
BUN SERPL-SCNC: 9 MG/DL (ref 7–17)
CALCIUM SPEC-MCNC: 9.3 MG/DL (ref 8.4–10.2)
CHLORIDE SERPL-SCNC: 103 MMOL/L (ref 98–107)
CO2 SERPL-SCNC: 28 MMOL/L (ref 22–30)
GLUCOSE SERPL-MCNC: 93 MG/DL (ref 74–99)
POTASSIUM SERPL-SCNC: 4 MMOL/L (ref 3.5–5.1)
SODIUM SERPL-SCNC: 137 MMOL/L (ref 137–145)

## 2022-12-06 PROCEDURE — 97166 OT EVAL MOD COMPLEX 45 MIN: CPT

## 2022-12-06 PROCEDURE — 97116 GAIT TRAINING THERAPY: CPT

## 2022-12-06 PROCEDURE — 97530 THERAPEUTIC ACTIVITIES: CPT

## 2022-12-06 PROCEDURE — 85025 COMPLETE CBC W/AUTO DIFF WBC: CPT

## 2022-12-06 PROCEDURE — 73020 X-RAY EXAM OF SHOULDER: CPT

## 2022-12-06 PROCEDURE — 64415 NJX AA&/STRD BRCH PLXS IMG: CPT

## 2022-12-06 PROCEDURE — 80048 BASIC METABOLIC PNL TOTAL CA: CPT

## 2022-12-06 PROCEDURE — 76942 ECHO GUIDE FOR BIOPSY: CPT

## 2022-12-06 PROCEDURE — 23472 RECONSTRUCT SHOULDER JOINT: CPT

## 2022-12-06 PROCEDURE — 97161 PT EVAL LOW COMPLEX 20 MIN: CPT

## 2022-12-06 PROCEDURE — 87635 SARS-COV-2 COVID-19 AMP PRB: CPT

## 2022-12-06 RX ADMIN — POTASSIUM CHLORIDE SCH MLS: 14.9 INJECTION, SOLUTION INTRAVENOUS at 10:48

## 2022-12-06 RX ADMIN — METOPROLOL TARTRATE SCH MG: 25 TABLET, FILM COATED ORAL at 20:09

## 2022-12-06 NOTE — P.CONS
History of Present Illness





- Reason for Consult


Consult date: 12/06/22


Medical management





- Chief Complaint


S/p shoulder surgery





- History of Present Illness





Patient is a 82-year-old female with a known history of hypertension, GERD, 

rheumatoid arthritis and history of back surgeries and prior history of smoking 

was admitted to the hospital for elective left shoulder hemiarthroplasty due to 

severe osteoarthritis.  Patient tolerated the procedure well.  Currently patient

is able to ambulate.  Pain is fairly controlled.  No complaints of chest pain or

shortness of breath.  No nausea vomiting abdominal pain or diarrhea.  No cough 

or sputum production.


Patient blood pressure was elevated with SBP in 180s on admission.


Laboratory data showed sodium 137 potassium 4.0 chloride 103 bicarb is 28 BUN 9 

and creatinine 0.58 and calcium 9.3.  X-ray of the shoulder showed postoperative

changes.  Left lower lobe infiltrate.








Review of Systems





Constitutional: Patient denies any fever or chills . no Generalized weakness.


Abdomen: Patient denied any nausea or vomiting or abd. pain


Cardiovascular: Patient denies any chest pain or short of breath no 

palpitations.


Respiratory: patient denied any cough . no sputum production.  No shortness of 

breath


Neurologic: Patient denied any numbness or tingling headache.


Musculoskeletal: Patient denies any complaints of joint swelling or deformity.


Skin: Negative


Psychiatric: Negative


Endocrine: No heat or cold intolerance.  No recent weight gain.


Genitourinary: No dysuria or hematuria.


All other 14 point ROS negative except the above





Past Medical History


Past Medical History: GERD/Reflux, Hypertension, Pneumonia, Rheumatoid Arthritis

(RA)


Additional Past Medical History / Comment(s): hx. of low sodium in the past, 

"goes up & down", unsure why


History of Any Multi-Drug Resistant Organisms: None Reported


Past Surgical History: Appendectomy, Back Surgery, Hysterectomy, Joint R

eplacement, Orthopedic Surgery


Additional Past Surgical History / Comment(s): 3 X Back surgeries, ORIF left 

ankle, right shoulder surg., right hip replaced


Past Anesthesia/Blood Transfusion Reactions: No Reported Reaction


Past Psychological History: No Psychological Hx Reported


Smoking Status: Former smoker


Past Alcohol Use History: None Reported


Additional Past Alcohol Use History / Comment(s): quit smoking 30 yrs. ago, 

smoked occasionally, not every day


Past Drug Use History: None Reported





- Past Family History


  ** Father


Family Medical History: No Reported History





Medications and Allergies


                                Home Medications











 Medication  Instructions  Recorded  Confirmed  Type


 


Celecoxib [CeleBREX] 400 mg PO DAILY 01/03/21 12/01/22 History


 


Losartan Potassium 100 mg PO QAM 01/03/21 12/01/22 History


 


Nitrofurantoin Monohyd/M-Cryst 100 mg PO MOWEFR 01/03/21 12/06/22 History





[Macrobid]    


 


Omeprazole 20 mg PO QAM 01/03/21 12/06/22 History


 


Tofacitinib Citrate [Xeljanz Xr] 11 mg PO QAM 01/04/21 12/01/22 History


 


Acetaminophen Tab [Tylenol] 650 mg PO Q6HR PRN  tab 01/06/21 12/06/22 Rx


 


Bifidobacterium Infantis [Align 10.5 mg PO DAILY 12/02/22 12/06/22 History





Chew]    


 


HYDROcodone/APAP 5-325MG [Norco 1 tab PO Q6HR PRN 12/02/22 12/02/22 History





5-325]    


 


Metoprolol Tartrate [Lopressor] 25 mg PO BID 12/02/22 12/02/22 History


 


Sodium Chloride Tab 1 gm PO DAILY 12/02/22 12/06/22 History


 


HYDROcodone/APAP 7.5-325MG [Norco 1 - 2 tab PO Q6H PRN #32 tab 12/06/22  Rx





7.5-325]    


 


Sennosides [Senokot] 2 tab PO DAILY PRN #60 tablet 12/06/22  Rx








                                    Allergies











Allergy/AdvReac Type Severity Reaction Status Date / Time


 


No Known Allergies Allergy   Verified 12/06/22 10:51














Physical Exam


Vitals: 


                                   Vital Signs











  Temp Pulse Pulse Resp BP Pulse Ox


 


 12/06/22 19:53  97.7 F   89  17  105/62  93 L


 


 12/06/22 14:30    74  17  174/80  97


 


 12/06/22 14:28  97.9 F   78  19  193/84  97


 


 12/06/22 14:15    76  17  169/72  97


 


 12/06/22 14:00    67  16  167/74  95


 


 12/06/22 13:45   74   16  168/74  94 L


 


 12/06/22 13:30  97.2 F L  73   16  185/74  100


 


 12/06/22 10:48  98.4 F  71   16  184/85  97








                                Intake and Output











 12/06/22 12/06/22 12/07/22





 14:59 22:59 06:59


 


Intake Total 550  


 


Output Total 50  


 


Balance 500  


 


Intake:   


 


    


 


Output:   


 


  Estimated Blood Loss 50  


 


Other:   


 


  Voiding Method  Toilet 


 


  # Voids  1 


 


  Weight 83 kg  














PHYSICAL EXAMINATION: 


Patient is lying in the bed comfortably, no acute distress, awake alert and 

oriented.. 


HEENT: Normocephalic. Neck is supple. Pupils reactive. Nostrils clear. Oral 

cavity is moist. 


Neck reveals no JVD, carotid bruits, or thyromegaly. 


CHEST EXAMINATION: Trachea is central. Symmetrical expansion. Lung fields clear 

to auscultation and percussion.


Bibasilar diminished sounds.


CARDIAC: Normal S1, S2 with no gallops. No murmurs 


ABDOMEN: Soft. Bowel sounds present.  Nontender.  No organomegaly. No abdominal 

bruits. 


Extremities: reveal no edema.  No clubbing or cyanosis


Neurologically awake, alert, oriented x3 with well-coordinated movements.  No 

focal deficits noted


Skin: No rash or skin lesions. 


Psychiatric: Coperative.  Nonsuicidal,


Musculoskeletal: No joint swelling or deformity.  Left shoulder surgical site 

bandaged.








Results


CBC & Chem 7: 


                                 12/06/22 11:50





Assessment and Plan


Assessment: 








S/p left shoulder hemiarthroplasty.  Postoperative day 0


Left lower lobe atelectasis/infiltrate.


Uncontrolled hypertension


Rheumatoid allergies history


History of back surgery


GERD


Prior history of smoking


DVT prophylaxis





plan:


Patient will be started on pain management, bowel regimen and incentive 

spirometry was added.  Encourage ambulation.  Follow-up repeat CBC tomorrow and 

continue with home blood pressure medications including losartan and metoprolol.

  Further recommendations based on clinical course.





Thank you for your consult.
Stable

## 2022-12-06 NOTE — P.OP
Date of Procedure: 12/06/22


Preoperative Diagnosis: 


Severe osteoarthritis left shoulder


Postoperative Diagnosis: 


Severe osteoarthritis left shoulder


Procedure(s) Performed: 


The shoulder hemiarthroplasty


Implants: 


Biomet Davies humeral resurfacing head size 3


Anesthesia: ANNALISA


Surgeon: Marvel Olivares


Assistant #1: Isadora Wagoner


Estimated Blood Loss (ml): 50


Pathology: none sent


Condition: stable


Disposition: PACU


Indications for Procedure: 


This is an 82-year-old female that has had pain in her left shoulder secondary 

to severe osteoarthritis.  She's had a prior humeral resurfacing 

hemiarthroplasty of her right shoulder and now presents for her left.  Informed 

consent was obtained.


Operative Findings: 


The operative findings are consistent with severe osteoarthritis of the left 

shoulder


Description of Procedure: 


The patient was seen in the preoperative area, consent was reviewed, and 

operative site was marked with a skin marker.  Patient was then brought to the 

operating room and given preoperative antibiotics intravenously.  Patient was 

also given 1 g of Tranexamic acid intravenously.  A general anesthetic was 

administered by the anesthesia department.  The patient was then placed in a 

beachchair position with the bony prominences well-padded and the head secured. 

The shoulder was then prepped and draped in the usual sterile fashion.  A 

universal timeout was then performed, which confirmed the patient's name, 

surgical site, ALLERGIES, and consent.





A standard deltopectoral approach was performed.  The skin and subcutaneous 

tissue was sharply dissected down to the deltoid fascia.  The cephalic vein was 

then identified and retracted medially.  The deltopectoral interval was then 

utilized to expose the subscapularis tendon.  A retractor was then placed under 

the coracobrachialis tendon retracted medially, and the deltoid.  The axillary 

nerve is palpated and protected throughout the procedure.  The subscapularis 

tendon was then released and retracted medially.  The humeral head was then 

exposed easily.  The humeral head was noted to have severe osteoarthritis with 

significant deformity and collapse of the humeral head.





The humerus was then sized and found to be a size 3.  A size 3 drill guide was 

placed over the humeral head in the central guidewire was then placed.  The 

central peg reamer was then placed over the guidewire in the central peg was 

reamed.  The size 3 implant was then opened and impacted on the humeral head 

after the shoulder had been thoroughly irrigated with antibiotic solution via 

pulsatile lavage.  The implant was checked and found to be stable.  The shoulder

was then reduced and taken through a range of motion and found to be stable.  

Shoulder was then irrigated with antibiotic solution.  A second dose of 1 g of 

Tranexamic acid was given.





The deltopectoral interval was then closed with #1 Vicryl as well.  The 

subcutaneous tissues were closed with 3-0 Vicryl then 3-0 stratafix.  Exofin 

glue was placed on the skin.  A sterile dressing was then applied, the patient 

was transported to the recovery room in an arm sling in stable condition.





The assistant UNIQUE David was required due the complexity of surgery and 

the need for a skilled surgical assistant.

## 2022-12-06 NOTE — XR
EXAMINATION TYPE: XR shoulder limited LT

 

DATE OF EXAM: 12/6/2022

 

COMPARISON: NONE

 

HISTORY: Postop

 

TECHNIQUE: One image submitted

 

FINDINGS: Postsurgical change in near-anatomic alignment. Soft tissue edema and emphysema noted. Ther
e is left lower lobe infiltrate.

 

IMPRESSION: 

1. Postoperative changes.

2. Left lower lobe infiltrate.

## 2022-12-07 LAB
BASOPHILS # BLD AUTO: 0.01 X 10*3/UL (ref 0–0.1)
BASOPHILS NFR BLD AUTO: 0.1 %
EOSINOPHIL # BLD AUTO: 0 X 10*3/UL (ref 0.04–0.35)
EOSINOPHIL NFR BLD AUTO: 0 %
ERYTHROCYTE [DISTWIDTH] IN BLOOD BY AUTOMATED COUNT: 3.49 X 10*6/UL (ref 4.1–5.2)
ERYTHROCYTE [DISTWIDTH] IN BLOOD: 15.2 % (ref 11.5–14.5)
HCT VFR BLD AUTO: 33.9 % (ref 37.2–46.3)
HGB BLD-MCNC: 10.4 G/DL (ref 12–15)
IMM GRANULOCYTES BLD QL AUTO: 0.5 %
LYMPHOCYTES # SPEC AUTO: 0.67 X 10*3/UL (ref 0.9–5)
LYMPHOCYTES NFR SPEC AUTO: 7.8 %
MCH RBC QN AUTO: 29.8 PG (ref 27–32)
MCHC RBC AUTO-ENTMCNC: 30.7 G/DL (ref 32–37)
MCV RBC AUTO: 97.1 FL (ref 80–97)
MONOCYTES # BLD AUTO: 0.94 X 10*3/UL (ref 0.2–1)
MONOCYTES NFR BLD AUTO: 11 %
NEUTROPHILS # BLD AUTO: 6.91 X 10*3/UL (ref 1.8–7.7)
NEUTROPHILS NFR BLD AUTO: 80.6 %
NRBC BLD AUTO-RTO: 0 /100 WBCS (ref 0–0)
PLATELET # BLD AUTO: 326 X 10*3/UL (ref 140–440)
WBC # BLD AUTO: 8.57 X 10*3/UL (ref 4.5–10)

## 2022-12-07 RX ADMIN — HYDROCODONE BITARTRATE AND ACETAMINOPHEN PRN EACH: 7.5; 325 TABLET ORAL at 23:19

## 2022-12-07 RX ADMIN — ASPIRIN SCH: 325 TABLET ORAL at 07:58

## 2022-12-07 RX ADMIN — HYDROCODONE BITARTRATE AND ACETAMINOPHEN PRN EACH: 7.5; 325 TABLET ORAL at 16:44

## 2022-12-07 RX ADMIN — POTASSIUM CHLORIDE SCH: 14.9 INJECTION, SOLUTION INTRAVENOUS at 04:47

## 2022-12-07 RX ADMIN — SODIUM CHLORIDE TAB 1 GM SCH GM: 1 TAB at 08:14

## 2022-12-07 RX ADMIN — METOPROLOL TARTRATE SCH MG: 25 TABLET, FILM COATED ORAL at 08:14

## 2022-12-07 RX ADMIN — LOSARTAN POTASSIUM SCH MG: 50 TABLET, FILM COATED ORAL at 08:14

## 2022-12-07 RX ADMIN — HYDROCODONE BITARTRATE AND ACETAMINOPHEN PRN EACH: 7.5; 325 TABLET ORAL at 10:39

## 2022-12-07 RX ADMIN — PANTOPRAZOLE SODIUM SCH MG: 40 TABLET, DELAYED RELEASE ORAL at 08:14

## 2022-12-07 RX ADMIN — METOPROLOL TARTRATE SCH MG: 25 TABLET, FILM COATED ORAL at 20:24

## 2022-12-07 NOTE — P.PN
Subjective


Progress Note Date: 12/07/22


Principal diagnosis: 





Status post left shoulder hemiarthroplasty





This is a 82 year-old female post left shoulder hemiarthroplasty.  This is post-

op day 1.  The patient was evaluated in the recliner chair at the bedside today.

 The patient denies nausea, vomiting, abdominal pain, shortness of breath, and 

chest pain this morning.  She states her pain is controlled at this time.  The 

patient has been up with physical therapy.





Objective





- Vital Signs


Vital signs: 


                                   Vital Signs











Temp  98.0 F   12/07/22 07:16


 


Pulse  67   12/07/22 07:16


 


Resp  18   12/07/22 07:16


 


BP  158/67   12/07/22 07:16


 


Pulse Ox  96   12/07/22 07:16


 


FiO2      








                                 Intake & Output











 12/06/22 12/07/22 12/07/22





 18:59 06:59 18:59


 


Intake Total 550 800 


 


Output Total 50  


 


Balance 500 800 


 


Weight 83 kg  


 


Intake:   


 


    


 


  Oral  800 


 


Output:   


 


  Estimated Blood Loss 50  


 


Other:   


 


  Voiding Method  Toilet 


 


  # Voids 1 3 














- Exam





The patient does not appear in acute distress.  Alert and orientated x3.  

Dressing is clean dry and intact.  Incision appears fine with no erythema or 

active drainage.  Arm is soft and nontender.  Good wrist and hand motion without

difficulty.  Sensation and circulatory status is intact.





- Labs


CBC & Chem 7: 


                                 12/06/22 11:50





Assessment and Plan


(1) Primary osteoarthritis, left shoulder


Current Visit: Yes   Status: Acute   Code(s): M19.012 - PRIMARY OSTEOARTHRITIS, 

LEFT SHOULDER   SNOMED Code(s): 806142521485546


   





(2) S/P shoulder hemiarthroplasty


Current Visit: Yes   Status: Acute   Code(s): Z96.619 - PRESENCE OF UNSPECIFIED 

ARTIFICIAL SHOULDER JOINT   SNOMED Code(s): 591078019


   


Plan: 





1.  Continue pain control


2.  Continue in arm sling


3.  Continue physical therapy and ambulation


4.  Anticipate discharge to skilled rehab in the next 1-2 days.

## 2022-12-07 NOTE — P.ANPRN
Procedure Note - Anesthesia





- Nerve Block Performed


  ** Left Interscalene Single


Time Out Performed: Yes


Date of Procedure: 12/07/22


Procedure Start Time: 12:15


Procedure Stop Time: 12:19


Location of Patient: PreOp


Indication: Acute Post-Operative Pain, Requested by Surgeon


Sedation Type: Sedate with meaningful contact maintained


Preparation: Sterile Prep


Position: Supine


Needle Types: Pajunk


Needle Gauge: 21


Ultrasound used to visualize needle placement: Yes


Ultrasound used to observe medication spread: Yes


Blood Aspirated: No


Pain Paresthesia on Injection Noted: No


Resistance on Injection: Normal


Image Stored and Saved: Yes


Events: Uneventful and Well Tolerated (Ropivacaine 0.5% 20 mL plus dexamethasone

4 mg)

## 2022-12-07 NOTE — P.PN
Subjective


Progress Note Date: 12/07/22











- Reason for Consult


Consult date: 12/06/22


Medical management





- Chief Complaint


S/p shoulder surgery





- History of Present Illness





Patient is a 82-year-old female with a known history of hypertension, GERD, 

rheumatoid arthritis and history of back surgeries and prior history of smoking 

was admitted to the hospital for elective left shoulder hemiarthroplasty due to 

severe osteoarthritis.  Patient tolerated the procedure well.  Currently patient

is able to ambulate.  Pain is fairly controlled.  No complaints of chest pain or

shortness of breath.  No nausea vomiting abdominal pain or diarrhea.  No cough 

or sputum production.


Patient blood pressure was elevated with SBP in 180s on admission.


Laboratory data showed sodium 137 potassium 4.0 chloride 103 bicarb is 28 BUN 9 

and creatinine 0.58 and calcium 9.3.  X-ray of the shoulder showed postoperative

changes.  Left lower lobe infiltrate.





12/07/2022





Patient is seen and evaluated in follow-up today currently sitting up in the 

chair.  Patient does have sling of the left shoulder noted on exam.  Patient is 

having some tenderness and pain although reports her pain medication when she 

takes that is managing her pain currently.  Patient is afebrile denies chest 

pain or shortness of breath.  Patient currently working on the incentive 

spirometer and encouraged her to continue using at least 10 times every hour 

while awake.  Patient reports to tolerating diet with no reports of nausea or 

vomiting noted.  Patient reports she did have gas with no bowel movement yet.  

Patient is urinating with no difficulties.





Review of systems:


Constitutional: No reports of fatigue, fever, or chills


Cardiovascular: No reports of chest pain or palpitations


Respiratory: No reports of shortness of breath or cough


GI: No reports of nausea, vomiting, or diarrhea


: No reports of dysuria or retention


Neurovascular: No reports of weakness or numbness





All medications have been reviewed





PHYSICAL EXAMINATION: 





Patient is currently sitting up in the chair, no acute distress, awake alert and

oriented.. 


HEENT: Normocephalic. Neck is supple. Pupils reactive. Nostrils clear. Oral 

cavity is moist. 


Neck reveals no JVD, carotid bruits, or thyromegaly. 


CHEST EXAMINATION: Trachea is central. Symmetrical expansion. Lung fields clear 

to auscultation and percussion.


Bibasilar diminished sounds.


CARDIAC: Normal S1, S2 with no gallops. No murmurs 


ABDOMEN: Soft. Bowel sounds present.  Nontender.  No organomegaly. No abdominal 

bruits. 


Extremities: reveal no edema.  No clubbing or cyanosis.  Left upper extremity 

currently in a sling with positive pulses noted and cap Refill less than 3


Neurologically awake, alert, oriented x3 with well-coordinated movements.  No f

ocal deficits noted


Skin: No rash or skin lesions. 


Psychiatric: Coperative.  Nonsuicidal,


Musculoskeletal: No joint swelling or deformity.  Left shoulder surgical site 

bandaged.





Assessment:





S/p left shoulder hemiarthroplasty.  Postoperative day 1


Left lower lobe atelectasis/infiltrate.


Uncontrolled hypertension


Rheumatoid arthritis history


History of back surgery


GERD


Former smoker


DVT prophylaxis


GI prophylaxis


Full code





Plan:





Recommend to continue with pain management per orthopedics and also physical 

therapy daily.


Recommend bowel regimen.  Patient reports she is passing gas although no bowel 

movement as of yet.  Encouraged increased activity as tolerated


Recommend incentive spirometer use and instructed the patient to continue using 

at least 10 times every hour while awake.


Hemoglobin is currently stable at 10.4 with no active bleeding noted.


Home medications have been reviewed and resumed and recommend to monitor vital 

signs closely


Case management following and patient will be going to [facility] 

currently awaiting insurance authorization





Thank you kindly for this consultation and we will continue to follow along with

orthopedics during hospitalization.





The impression and plan of care has been dictated by Magy Azevedo, Nurse 

Practitioner as directed.





Dr. Mark MD


I have performed a history and examination and MDM of this patient, discussed 

the same with the dictator, and  agree with the dictator's assessment and plan 

as written ,documented as a scribe. Based on total visit time,  I have performed

more than 50% of the visit.  





Objective





- Vital Signs


Vital signs: 


                                   Vital Signs











Temp  98.0 F   12/07/22 07:16


 


Pulse  67   12/07/22 07:16


 


Resp  18   12/07/22 07:16


 


BP  158/67   12/07/22 07:16


 


Pulse Ox  96   12/07/22 07:16


 


FiO2      








                                 Intake & Output











 12/06/22 12/07/22 12/07/22





 18:59 06:59 18:59


 


Intake Total 550 800 


 


Output Total 50  


 


Balance 500 800 


 


Weight 83 kg  


 


Intake:   


 


    


 


  Oral  800 


 


Output:   


 


  Estimated Blood Loss 50  


 


Other:   


 


  Voiding Method  Toilet 


 


  # Voids 1 3 














- Labs


CBC & Chem 7: 


                                 12/07/22 06:34





                                 12/06/22 11:50

## 2022-12-08 VITALS — TEMPERATURE: 98.1 F | SYSTOLIC BLOOD PRESSURE: 146 MMHG | DIASTOLIC BLOOD PRESSURE: 73 MMHG | HEART RATE: 68 BPM

## 2022-12-08 VITALS — RESPIRATION RATE: 17 BRPM

## 2022-12-08 RX ADMIN — METOPROLOL TARTRATE SCH MG: 25 TABLET, FILM COATED ORAL at 11:22

## 2022-12-08 RX ADMIN — PANTOPRAZOLE SODIUM SCH MG: 40 TABLET, DELAYED RELEASE ORAL at 11:22

## 2022-12-08 RX ADMIN — ASPIRIN SCH: 325 TABLET ORAL at 11:22

## 2022-12-08 RX ADMIN — SODIUM CHLORIDE TAB 1 GM SCH GM: 1 TAB at 11:22

## 2022-12-08 RX ADMIN — POTASSIUM CHLORIDE SCH: 14.9 INJECTION, SOLUTION INTRAVENOUS at 00:53

## 2022-12-08 RX ADMIN — HYDROCODONE BITARTRATE AND ACETAMINOPHEN PRN EACH: 7.5; 325 TABLET ORAL at 13:44

## 2022-12-08 RX ADMIN — LOSARTAN POTASSIUM SCH MG: 50 TABLET, FILM COATED ORAL at 11:21

## 2022-12-08 NOTE — P.DS
Providers


Date of admission: 


12/08/22 07:54





Expected date of discharge: 12/08/22


Attending physician: 


Marvel Olivares





Consults: 





                                        





12/06/22 13:36


Consult Physician Routine 


   Consulting Provider: Shagufta Emery


   Consult Reason/Comments: medical management


   Do you want consulting provider notified?: Yes











Primary care physician: 


Jem Strange








- Discharge Diagnosis(es)


(1) Primary osteoarthritis, left shoulder


Current Visit: Yes   Status: Acute   





(2) S/P shoulder hemiarthroplasty


Current Visit: Yes   Status: Acute   


Hospital Course: 


This is a 82-year-old female with known history of degenerative arthritis of the

left shoulder.  The patient presented for evaluation as an outpatient.  After 

discussion and consideration patient elects to proceed with left shoulder 

hemiarthroplasty. The patient is seen preoperatively by Dr. Olivares and jade monteiroterese cleared for surgery by their primary care physician.





Patient is admitted to [facility] on 12/06/2022 for left shoulder 

hemiarthroplasty.  The procedure is performed without complication or sequelae. 

The patient is doing well postoperatively.  Labs and vital signs are stable on 

day of discharge.





On day of discharge the patient's shoulder incision is healing well.  There is 

minimal erythema.  There is no drainage noted at this time.  There is minimal 

soft tissue swelling to the shoulder.  Patient has full hand and wrist motion 

without difficulty or pain.  Neurovascular status to the left upper extremity is

intact. Patient is discharged to [facility] in good condition. Please see [facility] rec for 

accurate list of home medications.








Plan - Discharge Summary


Discharge Rx Participant: Yes


New Discharge Prescriptions: 


New


   Sennosides [Senokot] 2 tab PO DAILY PRN #60 tablet


     PRN Reason: Constipation


   HYDROcodone/APAP 7.5-325MG [Norco 7.5-325] 1 - 2 tab PO Q6H PRN #32 tab


     PRN Reason: Pain





No Action


   Nitrofurantoin Monohyd/M-Cryst [Macrobid] 100 mg PO MOWEFR


   Losartan Potassium 100 mg PO QAM


   Celecoxib [CeleBREX] 400 mg PO DAILY


   Omeprazole 20 mg PO QAM


   Tofacitinib Citrate [Xeljanz Xr] 11 mg PO QAM


   Acetaminophen Tab [Tylenol] 650 mg PO Q6HR PRN  tab


     PRN Reason: Mild Pain Or Fever > 100.5


   Sodium Chloride Tab 1 gm PO DAILY


   Metoprolol Tartrate [Lopressor] 25 mg PO BID


   Bifidobacterium Infantis [Align Chew] 10.5 mg PO DAILY


   HYDROcodone/APAP 5-325MG [Norco 5-325] 1 tab PO Q6HR PRN


     PRN Reason: Pain


Discharge Medication List





Celecoxib [CeleBREX] 400 mg PO DAILY 01/03/21 [History]


Losartan Potassium 100 mg PO QAM 01/03/21 [History]


Nitrofurantoin Monohyd/M-Cryst [Macrobid] 100 mg PO MOWEFR 01/03/21 [History]


Omeprazole 20 mg PO QAM 01/03/21 [History]


Tofacitinib Citrate [Xeljanz Xr] 11 mg PO QAM 01/04/21 [History]


Acetaminophen Tab [Tylenol] 650 mg PO Q6HR PRN  tab 01/06/21 [Rx]


Bifidobacterium Infantis [Align Chew] 10.5 mg PO DAILY 12/02/22 [History]


HYDROcodone/APAP 5-325MG [Norco 5-325] 1 tab PO Q6HR PRN 12/02/22 [History]


Metoprolol Tartrate [Lopressor] 25 mg PO BID 12/02/22 [History]


Sodium Chloride Tab 1 gm PO DAILY 12/02/22 [History]


Sennosides [Senokot] 2 tab PO DAILY PRN #60 tablet 12/06/22 [Rx]


HYDROcodone/APAP 7.5-325MG [Norco 7.5-325] 1 - 2 tab PO Q6H PRN #32 tab 12/08/22

[Rx]








Follow up Appointment(s)/Referral(s): 


Jem Strange MD [Primary Care Provider] - 1 Week


Marvel Olivares DO [Doctor of Osteopathic Medicine] - 12/16/22 9:00 am (With 

Isadora)


Activity/Diet/Wound Care/Special Instructions: 


[facility]





Maintain sling as needed for comfort.


Dressing may be removed by home care nurse or by patient in 7 days. Then change 

dressing twice daily until follow up.


May shower with initial dressing intact and after removal.


If dressing become saturated, please remove.


May start gentle range of motion of the shoulder as tolerated.


Please follow-up with [facility] and call with any questions or c

oncerns, [phone].


Discharge Disposition: TRANSFER TO SNF/ECF

## 2022-12-08 NOTE — P.PN
Subjective


Progress Note Date: 12/08/22











- Reason for Consult


Consult date: 12/06/22


Medical management





- Chief Complaint


S/p shoulder surgery





- History of Present Illness





Patient is a 82-year-old female with a known history of hypertension, GERD, 

rheumatoid arthritis and history of back surgeries and prior history of smoking 

was admitted to the hospital for elective left shoulder hemiarthroplasty due to 

severe osteoarthritis.  Patient tolerated the procedure well.  Currently patient

is able to ambulate.  Pain is fairly controlled.  No complaints of chest pain or

shortness of breath.  No nausea vomiting abdominal pain or diarrhea.  No cough 

or sputum production.


Patient blood pressure was elevated with SBP in 180s on admission.


Laboratory data showed sodium 137 potassium 4.0 chloride 103 bicarb is 28 BUN 9 

and creatinine 0.58 and calcium 9.3.  X-ray of the shoulder showed postoperative

changes.  Left lower lobe infiltrate.





12/07/2022





Patient is seen and evaluated in follow-up today currently sitting up in the 

chair.  Patient does have sling of the left shoulder noted on exam.  Patient is 

having some tenderness and pain although reports her pain medication when she 

takes that is managing her pain currently.  Patient is afebrile denies chest 

pain or shortness of breath.  Patient currently working on the incentive 

spirometer and encouraged her to continue using at least 10 times every hour 

while awake.  Patient reports to tolerating diet with no reports of nausea or 

vomiting noted.  Patient reports she did have gas with no bowel movement yet.  

Patient is urinating with no difficulties.





12/08/2022





Patient is seen and evaluated in follow-up today currently lying in bed reports 

she is trying to rest although feeling well.  Patient reports her pain is 

currently managed on current regimen and we'll continue per orthopedics.  P

atient does have incentive spirometer at the bedside and encouraged to continue 

using at least 10 times every hour while awake.  Patient is voiding with no 

difficulties reports she is passing gas and did have a bowel movement.  Patient 

is tolerating diet with no reports of nausea or vomiting noted.  Patient 

currently awaiting discharge pending insurance authorization to [facility] for continued PT/OT therapy.  Vital signs are stable and patient is 

afebrile.





Review of systems:





Constitutional: No reports of fatigue, fever, or chills


Cardiovascular: No reports of chest pain or palpitations


Respiratory: No reports of shortness of breath or cough


GI: No reports of nausea, vomiting, or diarrhea, reports passing gas and had a 

bowel movement


: No reports of dysuria or retention


Neurovascular: reports of generalized  weakness





All medications have been reviewed





PHYSICAL EXAMINATION: 





Patient is currently  lying in the bed, no acute distress, awake alert and 

oriented.. 


HEENT: Normocephalic. Neck is supple. Pupils reactive. Nostrils clear. Oral 

cavity is moist. 


Neck reveals no JVD, carotid bruits, or thyromegaly. 


CHEST EXAMINATION: Trachea is central. Symmetrical expansion. Lung fields clear 

to auscultation and percussion.


Bibasilar diminished sounds.


CARDIAC: Normal S1, S2 with no gallops. No murmurs 


ABDOMEN: Soft. Bowel sounds present.  Nontender.  No organomegaly. No abdominal 

bruits. 


Extremities: reveal no edema.  No clubbing or cyanosis.  Left upper extremity cu

rrently in a sling with positive pulses noted and cap Refill less than 3


Neurologically awake, alert, oriented x3 with well-coordinated movements.  No 

focal deficits noted


Skin: No rash or skin lesions. 


Psychiatric: Coperative.  Nonsuicidal,


Musculoskeletal: No joint swelling or deformity.  Left shoulder surgical site 

bandaged.





Assessment:





S/p left shoulder hemiarthroplasty.  Postoperative day 2


Left lower lobe atelectasis/infiltrate.


Uncontrolled hypertension


Rheumatoid arthritis history


History of back surgery


GERD


Former smoker


DVT prophylaxis


GI prophylaxis


Full code





Plan:





Recommend to continue with pain management per orthopedics and also physical 

therapy daily. Patient will be going to [facility] once insurance 

authorization is obtained.


Recommend bowel regimen.  Patient reports she is passing gas  and had a  bowel 

movement.  Encouraged increased activity as tolerated


Recommend incentive spirometer use and instructed the patient to continue using 

at least 10 times every hour while awake.


Home medications have been reviewed and resumed and recommend to monitor vital 

signs closely


Case management following and patient will be going to [facility] 

currently awaiting insurance authorization


Possible discharge today pending insurance authorization.  





Thank you kindly for this consultation and we will continue to follow along with

orthopedics during hospitalization.





The impression and plan of care has been dictated by Magy Azevedo, Nurse 

Practitioner as directed.





Dr. Mark MD


I have performed a history and examination and MDM of this patient, discussed 

the same with the dictator, and  agree with the dictator's assessment and plan 

as written ,documented as a scribe. Based on total visit time,  I have performed

more than 50% of the visit.  





Objective





- Vital Signs


Vital signs: 


                                   Vital Signs











Temp  98.1 F   12/08/22 07:35


 


Pulse  68   12/08/22 07:35


 


Resp  17   12/08/22 07:35


 


BP  146/73   12/08/22 07:35


 


Pulse Ox  99   12/08/22 07:35


 


FiO2      








                                 Intake & Output











 12/07/22 12/08/22 12/08/22





 18:59 06:59 18:59


 


Other:   


 


  # Voids 3 5 


 


  # Bowel Movements 1  














- Labs


CBC & Chem 7: 


                                 12/07/22 06:34





                                 12/06/22 11:50

## 2022-12-11 ENCOUNTER — HOSPITAL ENCOUNTER (INPATIENT)
Dept: HOSPITAL 47 - EC | Age: 82
LOS: 4 days | Discharge: NURSING, REHAB OR LONG-TERM CARE | DRG: 560 | End: 2022-12-15
Attending: ORTHOPAEDIC SURGERY | Admitting: ORTHOPAEDIC SURGERY
Payer: MEDICARE

## 2022-12-11 DIAGNOSIS — I10: ICD-10-CM

## 2022-12-11 DIAGNOSIS — R54: ICD-10-CM

## 2022-12-11 DIAGNOSIS — Z79.899: ICD-10-CM

## 2022-12-11 DIAGNOSIS — Z87.891: ICD-10-CM

## 2022-12-11 DIAGNOSIS — J84.10: ICD-10-CM

## 2022-12-11 DIAGNOSIS — M06.9: ICD-10-CM
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
## 2022-12-11 DIAGNOSIS — T84.028A: Primary | ICD-10-CM

## 2022-12-11 DIAGNOSIS — E87.1: ICD-10-CM

## 2022-12-11 DIAGNOSIS — Z96.641: ICD-10-CM

## 2022-12-11 DIAGNOSIS — S43.015A: ICD-10-CM

## 2022-12-11 DIAGNOSIS — K44.9: ICD-10-CM

## 2022-12-11 DIAGNOSIS — Z96.612: ICD-10-CM
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
## 2022-12-11 DIAGNOSIS — Z79.82: ICD-10-CM

## 2022-12-11 DIAGNOSIS — M19.91: ICD-10-CM

## 2022-12-11 DIAGNOSIS — K80.20: ICD-10-CM

## 2022-12-11 DIAGNOSIS — Z20.822: ICD-10-CM

## 2022-12-11 DIAGNOSIS — R26.9: ICD-10-CM

## 2022-12-11 DIAGNOSIS — K21.9: ICD-10-CM
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
## 2022-12-11 DIAGNOSIS — M25.551: ICD-10-CM

## 2022-12-11 LAB
ANION GAP SERPL CALC-SCNC: 8 MMOL/L
BASOPHILS # BLD AUTO: 0 K/UL (ref 0–0.2)
BASOPHILS NFR BLD AUTO: 1 %
BUN SERPL-SCNC: 15 MG/DL (ref 7–17)
CALCIUM SPEC-MCNC: 9.3 MG/DL (ref 8.4–10.2)
CHLORIDE SERPL-SCNC: 101 MMOL/L (ref 98–107)
CO2 SERPL-SCNC: 21 MMOL/L (ref 22–30)
EOSINOPHIL # BLD AUTO: 0.1 K/UL (ref 0–0.7)
EOSINOPHIL NFR BLD AUTO: 2 %
ERYTHROCYTE [DISTWIDTH] IN BLOOD BY AUTOMATED COUNT: 3.68 M/UL (ref 3.8–5.4)
ERYTHROCYTE [DISTWIDTH] IN BLOOD: 13.8 % (ref 11.5–15.5)
GLUCOSE SERPL-MCNC: 115 MG/DL (ref 74–99)
HCT VFR BLD AUTO: 36 % (ref 34–46)
HGB BLD-MCNC: 11.5 GM/DL (ref 11.4–16)
LYMPHOCYTES # SPEC AUTO: 1 K/UL (ref 1–4.8)
LYMPHOCYTES NFR SPEC AUTO: 17 %
MCH RBC QN AUTO: 31.2 PG (ref 25–35)
MCHC RBC AUTO-ENTMCNC: 31.8 G/DL (ref 31–37)
MCV RBC AUTO: 97.8 FL (ref 80–100)
MONOCYTES # BLD AUTO: 0.6 K/UL (ref 0–1)
MONOCYTES NFR BLD AUTO: 9 %
NEUTROPHILS # BLD AUTO: 4.2 K/UL (ref 1.3–7.7)
NEUTROPHILS NFR BLD AUTO: 68 %
PLATELET # BLD AUTO: 333 K/UL (ref 150–450)
POTASSIUM SERPL-SCNC: 4.8 MMOL/L (ref 3.5–5.1)
SODIUM SERPL-SCNC: 130 MMOL/L (ref 137–145)
WBC # BLD AUTO: 6.2 K/UL (ref 3.8–10.6)

## 2022-12-11 PROCEDURE — 80048 BASIC METABOLIC PNL TOTAL CA: CPT

## 2022-12-11 PROCEDURE — 87635 SARS-COV-2 COVID-19 AMP PRB: CPT

## 2022-12-11 PROCEDURE — 99285 EMERGENCY DEPT VISIT HI MDM: CPT

## 2022-12-11 PROCEDURE — 85025 COMPLETE CBC W/AUTO DIFF WBC: CPT

## 2022-12-11 RX ADMIN — METOPROLOL TARTRATE SCH MG: 25 TABLET, FILM COATED ORAL at 13:57

## 2022-12-11 RX ADMIN — ASPIRIN SCH MG: 325 TABLET ORAL at 14:48

## 2022-12-11 RX ADMIN — METOPROLOL TARTRATE SCH MG: 25 TABLET, FILM COATED ORAL at 20:04

## 2022-12-11 RX ADMIN — SODIUM CHLORIDE TAB 1 GM SCH GM: 1 TAB at 13:57

## 2022-12-11 RX ADMIN — PANTOPRAZOLE SODIUM SCH MG: 40 TABLET, DELAYED RELEASE ORAL at 13:56

## 2022-12-11 RX ADMIN — Medication SCH MG: at 20:04

## 2022-12-11 RX ADMIN — HYDROCODONE BITARTRATE AND ACETAMINOPHEN PRN EACH: 7.5; 325 TABLET ORAL at 20:04

## 2022-12-11 RX ADMIN — LOSARTAN POTASSIUM SCH MG: 50 TABLET, FILM COATED ORAL at 13:58

## 2022-12-11 RX ADMIN — FLUTICASONE PROPIONATE SCH SPRAY: 50 SPRAY, METERED NASAL at 13:56

## 2022-12-11 RX ADMIN — Medication SCH MG: at 13:57

## 2022-12-11 NOTE — ED
Upper Extremity HPI





- General


Chief Complaint: Extremity Injury, Upper


Stated Complaint: left shoulder tranfer


Time Seen by Provider: 12/11/22 04:55


Source: EMS


Mode of arrival: EMS


Limitations: no limitations





- History of Present Illness


Initial Comments: 





82-year-old female with past medical history of hypertension, arthritis who is 

status post left shoulder hemiarthroplasty on the sixth by Dr. Olivares.  She 

presents as a transfer from [facility].  She is currently in a rehab 

facility there.  Yesterday she was going to the restroom when she fell.  She was

assisted to the ground however she did hit her left shoulder on the floor.  She 

was taken to the emergency department where an x-ray of her hip and shoulder was

performed.  She did have an anterior shoulder dislocation.  Patient refused to 

let the ER doctor there reduce it.  Stated that she only wanted her surgeon to 

reduce her shoulder.  She was given a Norco and transferred for facility.  

Patient states that her pain is 7 out of 10 currently.  She has been eating and 

drinking upon transfer down to our hospital.  Patient did not hit her head or 

lose consciousness.  No other alleviating, Percepting or modifying factors





- Related Data


                                Home Medications











 Medication  Instructions  Recorded  Confirmed


 


Celecoxib [CeleBREX] 400 mg PO DAILY 01/03/21 12/11/22


 


Losartan Potassium 100 mg PO DAILY 01/03/21 12/11/22


 


Tofacitinib Citrate [Xeljanz Xr] 11 mg PO DAILY 01/04/21 12/11/22


 


Bifidobacterium Infantis [Align 21 mg PO DAILY 12/02/22 12/11/22





Chew]   


 


Metoprolol Tartrate [Lopressor] 25 mg PO BID 12/02/22 12/11/22


 


Sodium Chloride Tab 1 gm PO DAILY 12/02/22 12/11/22


 


Docusate [Colace] 100 mg PO BID PRN 12/11/22 12/11/22


 


Fluticasone Nasal Spray [Flonase 1 spr EA NOSTRIL DAILY 12/11/22 12/11/22





Nasal [place]]   


 


Hempvana Pain Relief Cream 1 applic TOPICAL DAILY PRN 12/11/22 12/11/22


 


Nitrofurantoin Monohyd/M-Cryst 100 mg PO MOWEFR 12/11/22 12/11/22





[Macrobid]   


 


Sodium Bicarbonate Tab 650 mg PO BID 12/11/22 12/11/22








                                  Previous Rx's











 Medication  Instructions  Recorded


 


Acetaminophen Tab [Tylenol] 650 mg PO Q6HR PRN  tab 01/06/21


 


Zolpidem [Ambien] 5 mg PO HS PRN #3 tab 12/12/22


 


Calcium Carbonate [Tums] 500 mg PO TID PRN  tab 12/13/22


 


HYDROcodone/APAP 7.5-325MG [Norco 1 - 2 tab PO Q6H PRN #32 tab 12/13/22





7.5-325]  


 


Sennosides [Senokot] 2 tab PO DAILY PRN #60 tablet 12/13/22











                                    Allergies











Allergy/AdvReac Type Severity Reaction Status Date / Time


 


sulfamethoxazole Allergy  Nausea & Verified 12/12/22 07:59





[From Bactrim]   Vomiting &  





   Diarrhea  


 


Tetanus Vaccines and Toxoid Allergy  Unknown Verified 12/12/22 08:00





   Childhood  


 


trimethoprim [From Bactrim] Allergy  Nausea & Verified 12/12/22 07:59





   Vomiting &  





   Diarrhea  














Review of Systems


ROS Statement: 


Those systems with pertinent positive or pertinent negative responses have been 

documented in the HPI.





ROS Other: All systems not noted in ROS Statement are negative.





Past Medical History


Past Medical History: GERD/Reflux, Hypertension, Pneumonia, Rheumatoid Arthritis

(RA)


Additional Past Medical History / Comment(s): hx. of low sodium in the past, 

"goes up & down", unsure why


History of Any Multi-Drug Resistant Organisms: None Reported


Past Surgical History: Appendectomy, Back Surgery, Hysterectomy, Joint 

Replacement, Orthopedic Surgery


Additional Past Surgical History / Comment(s): 3 X Back surgeries, ORIF left 

ankle, right shoulder surg., right hip replaced


Past Anesthesia/Blood Transfusion Reactions: No Reported Reaction


Past Psychological History: No Psychological Hx Reported


Smoking Status: Former smoker


Past Alcohol Use History: None Reported


Past Drug Use History: None Reported





- Past Family History


  ** Father


Family Medical History: No Reported History





General Exam


Limitations: no limitations


General appearance: alert, in no apparent distress


Head exam: Present: atraumatic, normocephalic, normal inspection


ENT exam: Present: normal exam, mucous membranes moist


Neck exam: Present: normal inspection.  Absent: tenderness, meningismus, 

lymphadenopathy


Respiratory exam: Present: normal lung sounds bilaterally.  Absent: respiratory 

distress, wheezes, rales, rhonchi, stridor


Cardiovascular Exam: Present: regular rate, normal rhythm, normal heart sounds. 

Absent: systolic murmur, diastolic murmur, rubs, gallop, clicks


Extremities exam: Present: other (tenderness left shoulder with deformity. 

decreased ROM. 2+ radial and ulnar pulses. No wrist of elbow pain)





Course


                                   Vital Signs











  12/11/22 12/11/22





  04:51 07:00


 


Temperature 98.7 F 97.7 F


 


Pulse Rate 67 


 


Pulse Rate [  71





Radial]  


 


Respiratory 15 16





Rate  


 


Blood Pressure 183/74 


 


Blood Pressure  176/76





[Right Arm]  


 


O2 Sat by Pulse 97 98





Oximetry  














Medical Decision Making





- Medical Decision Making





On arrival patient was placed into room 4.  Thorough history and physical exam 

was performed.  I did review outside imaging.  Patient does have a left anterior

shoulder dislocation.  Reporting that her pain is 7 out of 10.  She was given a 

Norco 7.5.  I called and spoke with Dr. Dorado who is on-call for the orthopedic 

group.  Patient is refusing reduction by myself and by the orthopedist on-call. 

She is stating that she will only allow Dr. Olivares to reduce her shoulder.  

Because of this Dr. Dorado did recommend that I admit the patient to their 

service and he will speak with Dr. Olivares about performing the procedure.  She

understood that she would have to wait until tomorrow and is agreeable to 

waiting





- Lab Data


Result diagrams: 


                                 12/11/22 09:50





                                 12/14/22 07:20


                                   Lab Results











  12/11/22 12/11/22 Range/Units





  09:50 09:50 


 


WBC  6.2   (3.8-10.6)  k/uL


 


RBC  3.68 L   (3.80-5.40)  m/uL


 


Hgb  11.5   (11.4-16.0)  gm/dL


 


Hct  36.0   (34.0-46.0)  %


 


MCV  97.8   (80.0-100.0)  fL


 


MCH  31.2   (25.0-35.0)  pg


 


MCHC  31.8   (31.0-37.0)  g/dL


 


RDW  13.8   (11.5-15.5)  %


 


Plt Count  333   (150-450)  k/uL


 


MPV  8.0   


 


Neutrophils %  68   %


 


Lymphocytes %  17   %


 


Monocytes %  9   %


 


Eosinophils %  2   %


 


Basophils %  1   %


 


Neutrophils #  4.2   (1.3-7.7)  k/uL


 


Lymphocytes #  1.0   (1.0-4.8)  k/uL


 


Monocytes #  0.6   (0-1.0)  k/uL


 


Eosinophils #  0.1   (0-0.7)  k/uL


 


Basophils #  0.0   (0-0.2)  k/uL


 


Hypochromasia  Slight   


 


Sodium   130 L  (137-145)  mmol/L


 


Potassium   4.8  (3.5-5.1)  mmol/L


 


Chloride   101  ()  mmol/L


 


Carbon Dioxide   21 L  (22-30)  mmol/L


 


Anion Gap   8  mmol/L


 


BUN   15  (7-17)  mg/dL


 


Creatinine   0.47 L  (0.52-1.04)  mg/dL


 


Est GFR (CKD-EPI)AfAm   >90  (>60 ml/min/1.73 sqM)  


 


Est GFR (CKD-EPI)NonAf   >90  (>60 ml/min/1.73 sqM)  


 


Glucose   115 H  (74-99)  mg/dL


 


Calcium   9.3  (8.4-10.2)  mg/dL














Disposition


Clinical Impression: 


 Fall, Dislocation of left shoulder joint, S/P shoulder hemiarthroplasty





Disposition: ADMITTED AS IP TO THIS [facility]


Condition: Stable


Is patient prescribed a controlled substance at d/c from ED?: No


Time of Disposition: 06:01


Decision to Admit Reason: Admit from EC


Decision Date: 12/11/22


Decision Time: 06:01

## 2022-12-11 NOTE — P.HPOR
History of Present Illness


H&P Date: 12/11/22


Chief Complaint: Left shoulder pain.


This is an 82-year-old female who is status post hemiarthroplasty of the left 

shoulder on 12/06/2022 with Dr. Marvel Olivares.  She was at inpatient rehab when

she was getting up with an aide and fell.  She sustained injury to her left shou

lder in the fall.  X-rays were taken and she was transferred to [facility] emergency department with a dislocated left shoulder.  The 

patient refused to have emergency department staff reduce the shoulder and was 

requesting that only Dr. Olivares do the reduction.  She is admitted to our 

service.  She denies any other injuries.








Past Medical History


Past Medical History: GERD/Reflux, Hypertension, Pneumonia, Rheumatoid Arthritis

(RA)


Additional Past Medical History / Comment(s): hx. of low sodium in the past, "go

es up & down", unsure why


History of Any Multi-Drug Resistant Organisms: None Reported


Past Surgical History: Appendectomy, Back Surgery, Hysterectomy, Joint 

Replacement, Orthopedic Surgery


Additional Past Surgical History / Comment(s): 3 X Back surgeries, ORIF left 

ankle, right shoulder surg., right hip replaced


Past Anesthesia/Blood Transfusion Reactions: No Reported Reaction


Past Psychological History: No Psychological Hx Reported


Smoking Status: Former smoker


Past Alcohol Use History: None Reported


Past Drug Use History: None Reported





- Past Family History


  ** Father


Family Medical History: No Reported History





Medications and Allergies


                                Home Medications











 Medication  Instructions  Recorded  Confirmed  Type


 


Celecoxib [CeleBREX] 400 mg PO DAILY 01/03/21 12/01/22 History


 


Losartan Potassium 100 mg PO QAM 01/03/21 12/01/22 History


 


Omeprazole 20 mg PO QAM 01/03/21 12/06/22 History


 


Tofacitinib Citrate [Xeljanz Xr] 11 mg PO QAM 01/04/21 12/01/22 History


 


Acetaminophen Tab [Tylenol] 650 mg PO Q6HR PRN  tab 01/06/21 12/06/22 Rx


 


Bifidobacterium Infantis [Align 10.5 mg PO DAILY 12/02/22 12/06/22 History





Chew]    


 


Metoprolol Tartrate [Lopressor] 25 mg PO BID 12/02/22 12/02/22 History


 


Sodium Chloride Tab 1 gm PO DAILY 12/02/22 12/06/22 History


 


Sennosides [Senokot] 2 tab PO DAILY PRN #60 tablet 12/06/22  Rx


 


HYDROcodone/APAP 7.5-325MG [Norco 1 - 2 tab PO Q6H PRN #32 tab 12/08/22  Rx





7.5-325]    


 


Zolpidem [Ambien] 5 mg PO HS PRN #3 tab 12/08/22  Rx








                                    Allergies











Allergy/AdvReac Type Severity Reaction Status Date / Time


 


No Known Allergies Allergy   Verified 12/11/22 04:51














Physical Examination


This is a pleasant 82-year-old female in no acute distress.  She is alert and 

oriented 3.  Exam of the upper extremities reveals mild soft tissue swelling 

over the shoulder.  Optifoam dressing is clean, dry and intact.  She has full 

wrist and finger motion without difficulty or pain.  Neurovascular status the 

upper extremity is intact.


Exam of the lower extremities reveals no loss deformity.  She has no obvious hip

 irritability.  She has full foot and ankle motion bilaterally.  Neurovascular 

status to the lower extremities is intact.








Results


X-rays of the left shoulder reveal a dislocated humeral head.  The component 

appears to be intact.  No fractures identified.


Exam of the right hip reveals components in good position and alignment.  There 

are no fractures noted.  No evidence of dislocation.








- Labs


Result Diagrams: 


                                 12/11/22 09:50








Assessment and Plan


(1) Dislocation of left shoulder joint


Current Visit: Yes   Status: Acute   Code(s): S43.005A - UNSPECIFIED DISLOCATION

 OF LEFT SHOULDER JOINT, INIT ENCNTR   SNOMED Code(s): 844035054


   





(2) Fall


Current Visit: Yes   Status: Acute   Code(s): W19.XXXA - UNSPECIFIED FALL, 

INITIAL ENCOUNTER   SNOMED Code(s): 2704638


   





(3) S/P shoulder hemiarthroplasty


Current Visit: Yes   Status: Acute   Code(s): Z96.619 - PRESENCE OF UNSPECIFIED 

ARTIFICIAL SHOULDER JOINT   SNOMED Code(s): 732743167


   





(4) S/P total right hip arthroplasty


Current Visit: No   Status: Acute   Code(s): Z96.641 - PRESENCE OF RIGHT 

ARTIFICIAL HIP JOINT   SNOMED Code(s): 545874858935


   


Plan: 


The clinical and x-ray findings are discussed with the patient.  I have reviewed

 the case with Dr. Olivares who will perform a closed reduction tomorrow in the 

operating room.  The procedures discussed with the patient in detail.  She may 

return to inpatient rehab once discharged from the hospital.  We will consult 

internal medicine for medical management and presurgical clearance.

## 2022-12-11 NOTE — P.CONS
History of Present Illness





- Reason for Consult


Consult date: 12/11/22


Medical management


Requesting physician: Jem Strange





- Chief Complaint


Left shoulder pain





- History of Present Illness





This is a pleasant 82-year-old patient follows with Dr. Strange.  Chronic stable 

medical conditions include interstitial lung disease/gallstones/hiatal herni

a/essential hypertension/urarthritis.  Patient on December 6 by Dr. Olivares 

underwent patient underwent shoulder hemiarthroplasty for severe ostial 

arthritis.  On December 8 patient was discharged to [facility].  Patient was going to 

the restroom without and took a fall falling on the left side.  Left shoulder 

hemiarthroplasty dislocated.  Patient did not want ER to reduce the same.  Left 

arm in a sling..  Appetite fair.  No new fractures.  She was transferred to our 

ER from [facility].





Review of systems:


GEN.:  Tired


EYES: None


HEENT: None


NECK: None


RESPIRATORY: None


CARDIOVASCULAR: None


GASTROINTESTINAL: None


GENITOURINARY: None


MUSCULOSKELETAL: Joint pains


LYMPHATICS: None


HEMATOLOGICAL: None  


PSYCHIATRY: None


NEUROLOGICAL: Uses a walker





Past medical history to include:


GERD, hypertension, rheumatoid arthritis, pulmonary fibrosis osteoarthritis





Social history:


Currently at rehab.  Stop smoking 30 years ago.  Smoked occasionally.  No 

alcohol.





Family history:


Reviewed, noncontributory to presentation





Physical examination:


VITAL SIGNS: 97.7, 71, 16, 176/76, 98% room air


GENERAL: BMI 30.4, declining in bed, awake, not in distress.


EYES: Pupils equal.  Conjunctiva normal.


HEENT: External appearance of nose and ears normal, oral cavity grossly normal.


NECK: JVD not raised; masses not palpable.


HEART: First and second heart sounds are normal;  no edema.  


LUNGS: Respiratory rate normal; fine crackles.  


ABDOMEN: Soft,  nontender, liver spleen not palpable, no masses palpable.  


PSYCH: Alert and oriented x3;  mood  and affect normal.  


MUSCULOSKELETAL:No Clubbing/cyanosis;muscles-grossly intact.  Evidence of RA and

OA left arm in a sling


NEUROLOGICAL: Cranial nerves grossly intact; no facial asymmetry,   power and 

sensation grossly intact. 


LYMPHATICS: No lymph nodes palpable in the axilla and neck





INVESTIGATIONS, reviewed in the clinical context:


White count 6.2 hemoglobin 11.5 platelets 333 sodium 1:30 potassium 4.8 

creatinine 0.47


X-ray of left shoulder [facility] showed left anterior shoulder 

dislocation.





Assessment and plan:





-Anterior shoulder dislocation left, with recent hemiarthroplasty on 12/08/2022 

by Dr. Olivares.  Left arm in a sling





-Chronic pulmonary fibrosis, from underlying rheumatoid arthritis





-Chronic rheumatoid arthritis


xeljanz, Celebrex





-Primary osteoarthritis


Celebrex





-GERD


Omeprazole





-Essential hypertension


Losartan, Lopressor





-Chronic gait dysfunction, uses a walker at baseline


Fall precautions





Resume medications.  Care was discussed with the patient.  Subcu Lovenox.  

Shoulder with reduced by Dr. Olivares s team.





Thank you Dr. Olivares





Past Medical History


Past Medical History: GERD/Reflux, Hypertension, Pneumonia, Rheumatoid Arthritis

(RA)


Additional Past Medical History / Comment(s): hx. of low sodium in the past, 

"goes up & down", unsure why


History of Any Multi-Drug Resistant Organisms: None Reported


Past Surgical History: Appendectomy, Back Surgery, Hysterectomy, Joint 

Replacement, Orthopedic Surgery


Additional Past Surgical History / Comment(s): 3 X Back surgeries, ORIF left 

ankle, right shoulder surg., right hip replaced


Past Anesthesia/Blood Transfusion Reactions: No Reported Reaction


Past Psychological History: No Psychological Hx Reported


Smoking Status: Former smoker


Past Alcohol Use History: None Reported


Past Drug Use History: None Reported





- Past Family History


  ** Father


Family Medical History: No Reported History





Medications and Allergies


                                Home Medications











 Medication  Instructions  Recorded  Confirmed  Type


 


Celecoxib [CeleBREX] 400 mg PO DAILY 01/03/21 12/11/22 History


 


Losartan Potassium 100 mg PO DAILY 01/03/21 12/11/22 History


 


Omeprazole 20 mg PO AC-BRKFST 01/03/21 12/11/22 History


 


Tofacitinib Citrate [Xeljanz Xr] 11 mg PO DAILY 01/04/21 12/11/22 History


 


Acetaminophen Tab [Tylenol] 650 mg PO Q6HR PRN  tab 01/06/21 12/11/22 Rx


 


Bifidobacterium Infantis [Align 21 mg PO DAILY 12/02/22 12/11/22 History





Chew]    


 


Metoprolol Tartrate [Lopressor] 25 mg PO BID 12/02/22 12/11/22 History


 


Sodium Chloride Tab 1 gm PO DAILY 12/02/22 12/11/22 History


 


HYDROcodone/APAP 7.5-325MG [Norco 1 - 2 tab PO Q6H PRN #32 tab 12/08/22 12/11/22

 Rx





7.5-325]    


 


Zolpidem [Ambien] 5 mg PO HS PRN #3 tab 12/08/22 12/11/22 Rx


 


Aspirin EC [Ecotrin] 325 mg PO BID 12/11/22  History


 


Docusate [Colace] 100 mg PO BID PRN 12/11/22 12/11/22 History


 


Fluticasone Nasal Spray [Flonase 1 spr EA NOSTRIL DAILY 12/11/22 12/11/22 

History





Nasal Spray]    


 


Hempvana Pain Relief Cream 1 applic TOPICAL DAILY PRN 12/11/22 12/11/22 History


 


Nitrofurantoin Monohyd/M-Cryst 100 mg PO MOWEFR 12/11/22 12/11/22 History





[Macrobid]    


 


Sodium Bicarbonate Tab 650 mg PO BID 12/11/22 12/11/22 History








                                    Allergies











Allergy/AdvReac Type Severity Reaction Status Date / Time


 


No Known Allergies Allergy   Verified 12/11/22 11:13














Physical Exam


Vitals: 


                                   Vital Signs











  Temp Pulse Pulse Resp BP BP Pulse Ox


 


 12/11/22 07:00  97.7 F   71  16   176/76  98


 


 12/11/22 04:51  98.7 F  67   15  183/74   97








                                Intake and Output











 12/10/22 12/11/22 12/11/22





 22:59 06:59 14:59


 


Intake Total   120


 


Balance   120


 


Intake:   


 


  Oral   120


 


Other:   


 


  # Voids   1


 


  Weight  90.718 kg 














Results


CBC & Chem 7: 


                                 12/11/22 09:50





                                 12/11/22 09:50


Labs: 


                  Abnormal Lab Results - Last 24 Hours (Table)











  12/11/22 12/11/22 Range/Units





  09:50 09:50 


 


RBC  3.68 L   (3.80-5.40)  m/uL


 


Sodium   130 L  (137-145)  mmol/L


 


Carbon Dioxide   21 L  (22-30)  mmol/L


 


Creatinine   0.47 L  (0.52-1.04)  mg/dL


 


Glucose   115 H  (74-99)  mg/dL

## 2022-12-12 PROCEDURE — 0RSKXZZ REPOSITION LEFT SHOULDER JOINT, EXTERNAL APPROACH: ICD-10-PCS

## 2022-12-12 RX ADMIN — SODIUM CHLORIDE TAB 1 GM SCH: 1 TAB at 10:50

## 2022-12-12 RX ADMIN — Medication SCH: at 10:46

## 2022-12-12 RX ADMIN — Medication SCH: at 20:04

## 2022-12-12 RX ADMIN — METOPROLOL TARTRATE SCH MG: 25 TABLET, FILM COATED ORAL at 20:05

## 2022-12-12 RX ADMIN — LOSARTAN POTASSIUM SCH MG: 50 TABLET, FILM COATED ORAL at 10:46

## 2022-12-12 RX ADMIN — HYDROCODONE BITARTRATE AND ACETAMINOPHEN PRN EACH: 7.5; 325 TABLET ORAL at 13:56

## 2022-12-12 RX ADMIN — ZOLPIDEM TARTRATE PRN MG: 5 TABLET ORAL at 22:43

## 2022-12-12 RX ADMIN — METOPROLOL TARTRATE SCH MG: 25 TABLET, FILM COATED ORAL at 10:46

## 2022-12-12 RX ADMIN — NITROFURANTOIN (MONOHYDRATE/MACROCRYSTALS) SCH MG: 75; 25 CAPSULE ORAL at 10:46

## 2022-12-12 RX ADMIN — PANTOPRAZOLE SODIUM SCH MG: 40 TABLET, DELAYED RELEASE ORAL at 05:06

## 2022-12-12 RX ADMIN — FLUTICASONE PROPIONATE SCH SPRAY: 50 SPRAY, METERED NASAL at 10:51

## 2022-12-12 RX ADMIN — ASPIRIN SCH MG: 325 TABLET ORAL at 10:50

## 2022-12-12 NOTE — CT
EXAMINATION TYPE: CT hip RT wo con

 

DATE OF EXAM: 12/12/2022

 

COMPARISON: None

 

HISTORY: Fall

 

CT DLP: 734.40 mGycm

Automated exposure control for dose reduction was used. Unenhanced CT of the right hip was performed 
with bone and soft tissue window settings submitted. 3-D reconstruction is obtained at a separate wor
kstation.

 

FINDINGS: 

Total hip arthroplasty is noted to be in place with the acetabular and femoral components appearing w
ell seated. Streak artifact limits evaluation. There is no evidence for loosening or fracture/disloca
tion. Soft tissues are grossly unremarkable. Pelvic structures as visualized are within normal limits
. Postoperative changes of the lumbosacral spine.

 

IMPRESSION: 

Total hip arthroplasty is noted to be in place with the acetabular and femoral components appearing w
ell seated.

## 2022-12-12 NOTE — P.PN
Progress Note - Text


Progress Note Date: 12/12/22





- Chief Complaint


Left shoulder pain








Hospital course:


This is a pleasant 82-year-old patient follows with Dr. Strange.  Chronic stable 

medical conditions include interstitial lung disease/gallstones/hiatal 

hernia/essential hypertension/urarthritis.  Patient on December 6 by Dr. Olivares underwent patient underwent shoulder hemiarthroplasty for severe 

osteoarthritis.  On December 8 patient was discharged to [facility].  Patient was going

to the restroom without and took a fall falling on the left side.  Left shoulder

hemiarthroplasty dislocated.  Patient did not want ER to reduce the same.  Left 

arm in a sling..  Appetite fair.  No new fractures.  She was transferred to our 

ER from [facility].


12/12/2022: Patient under today was taken by Dr. Olivares for shoulder 

reduction.  Computed tomography scan following the procedure showed that the 

reduction did not occur.  May have to be left to same day.  CT of the hip-

negative for fracture.





Active Medications





Acetaminophen (Acetaminophen Tab 325 Mg Tab)  650 mg PO Q6HR PRN


   PRN Reason: Mild Pain or Fever > 100.5


   Last Admin: 12/12/22 11:29 Dose:  650 mg


   


Hydrocodone Bitart/Acetaminophen (Hydrocodone/Apap 7.5-325mg 1 Each Tab)  1 - 2 

each PO Q6HR PRN


   PRN Reason: Pain


   Last Admin: 12/12/22 13:56 Dose:  2 each


   


Fluticasone Propionate (Fluticasone 50mcg/Spray Nasal 16gm)  1 spray EA NOSTRIL 

DAILY [facility]


   Last Admin: 12/12/22 10:51 Dose:  1 spray


   


Losartan Potassium (Losartan 50 Mg Tab)  100 mg PO DAILY [facility]


   Last Admin: 12/12/22 10:46 Dose:  100 mg


   


Metoprolol Tartrate (Metoprolol Tartrate 25 Mg Tab)  25 mg PO BID [facility]


   Last Admin: 12/12/22 10:46 Dose:  25 mg


   


Naloxone HCl (Naloxone 0.4 Mg/Ml 1 Ml Vial)  0.2 mg IV Q2M PRN


   PRN Reason: Opioid Reversal


Nitrofurantoin Macrocrystals (Nitrofurantoin Monohyd/M-Cryst 100 Mg Cap)  100 mg

PO MOWEFR [facility]; Protocol


   Last Admin: 12/12/22 10:46 Dose:  100 mg


   


Non-Formulary Medication (Tofacitinib Citrate [Xeljanz Xr])  11 mg PO DAILY [facility]


   Last Admin: 12/12/22 10:50 Dose:  11 mg


   


Ondansetron HCl (Ondansetron 4 Mg/2 Ml Vial)  4 mg IVP Q6HR PRN


   PRN Reason: Nausea And Vomiting


   Last Admin: 12/12/22 05:06 Dose:  4 mg


   


Pantoprazole Sodium (Pantoprazole 40 Mg Tablet)  40 mg PO AC-BRKFST [facility]


   Last Admin: 12/12/22 05:06 Dose:  40 mg


   


Sodium Bicarbonate (Sodium Bicarbonate Tab 650 Mg Tab)  650 mg PO BID [facility]


   Last Admin: 12/12/22 10:46 Dose:  Not Given


   


Sodium Chloride (Sodium Chloride Tab 1 Gm Tab)  1 gm PO DAILY [facility]


   Last Admin: 12/12/22 10:50 Dose:  Not Given


   


Zolpidem Tartrate (Zolpidem 5 Mg Tab)  5 mg PO HS PRN


   PRN Reason: Insomnia











Past medical history to include:


GERD, hypertension, rheumatoid arthritis, pulmonary fibrosis osteoarthritis





Social history:


Currently at rehab.  Stop smoking 30 years ago.  Smoked occasionally.  No 

alcohol.





Family history:


Reviewed, noncontributory to presentation





Physical examination:


VITAL SIGNS: Afebrile, 74, 18, 1:30/70, 92% room air


GENERAL: Reclining in bed, awake comfortable.


EYES: Pupils equal.  Conjunctiva normal.


HEENT: External appearance of nose and ears normal, oral cavity grossly normal.


NECK: JVD not raised; masses not palpable.


HEART: First and second heart sounds are normal;  no edema.  


LUNGS: Respiratory rate normal; fine crackles.  


ABDOMEN: Soft,  nontender, liver spleen not palpable, no masses palpable.  


PSYCH: Alert and oriented x3;  mood  and affect normal.  


MUSCULOSKELETAL:No Clubbing/cyanosis;muscles-grossly intact.  Evidence of RA and

OA.  left arm in a sling





INVESTIGATIONS, reviewed in the clinical context:


White count 6.2 hemoglobin 11.5 platelets 333 sodium 1:30 potassium 4.8 

creatinine 0.47


X-ray of left shoulder [facility] showed left anterior shoulder 

dislocation.





Assessment and plan:





-Anterior shoulder dislocation left, with recent hemiarthroplasty on 12/08/2022 

by Dr. Olivares.  Left arm in a sling


Today correction of dislocations attempted by Dr. Olivares.  Computed tomography

scan of the shoulder post-attempt showed dislocation still to be present.





-Chronic pulmonary fibrosis, from underlying rheumatoid arthritis





-Chronic rheumatoid arthritis


xeljanz, Celebrex





-Primary osteoarthritis


Celebrex





-GERD


Omeprazole





-Essential hypertension


Losartan, Lopressor





-Chronic gait dysfunction, uses a walker at baseline


Fall precautions





Continue current medication treatment plan.  Probable dislocation arthroplasty 

will be left as of this..  Given the fragility of the tissues around the same.


Thank you Dr. Olivares

## 2022-12-12 NOTE — P.OP
Date of Procedure: 12/12/22


Preoperative Diagnosis: 


Dislocation left shoulder status post hemiarthroplasty


Postoperative Diagnosis: 


Dislocation left shoulder status post hemiarthroplasty


Procedure(s) Performed: 


Closed reduction left shoulder


Anesthesia: MAC


Surgeon: Marvel Olivares


Assistant #1: Isadora Wagoner


Estimated Blood Loss (ml): 0


Pathology: none sent


Condition: stable


Disposition: PACU


Indications for Procedure: 


This is an 82-year-old female that had a left shoulder hemiarthroplasty 1 week 

ago.  She's currently staying in a rehab facility after her surgery.  She 

sustained a fall over the weekend and landed directly onto that left shoulder.  

She also hurt her right hip which was replaced as well.  X-rays demonstrated no 

fracture right hip, but there appeared to be an anterior dislocation of her left

shoulder.  There was no evidence of any fractures.  Patient was then brought to 

[facility] and after she was seen and evaluated by myself I've 

recommended attempted closed reduction of her left shoulder.  It was explained 

to her and her daughter that the patient is quite frail and that her soft tissue

and bone around the shoulder are extremely frayed and this may have helped 

contribute to her dislocation.  Informed consent was obtained for a close red

uction


Operative Findings: 


The operative findings are consistent with anterior dislocation of her left 

shoulder.


Description of Procedure: 


The patient was seen in the recovery room the operative site was marked with a 

skin marker.  The consent was reviewed and a universal timeout was performed.  A

sedative anesthetic was administered by the anesthesia department.  After 

adequate anesthesia was administered, attempted a close reduction was performed.

 There was no definitive reduction of the shoulder, and portal x-ray did not 

confirm complete reduction.  On range of motion testing of the shoulder was felt

to sublux in and out quite easily.  Patient was placed in an arm sling with no 

apparent complications.





After the procedure I talked at length with the daughter about the situation.  I

recommended getting a computed tomography scan of her shoulder further evaluate 

it.  I discussed the possibility of no further surgical treatment and just rehab

her shoulder secondary to the frail nature of the patient.  The daughters in 

complete agreement and we will obtain a CAT scan with the recommendations to 

follow.

## 2022-12-12 NOTE — XR
EXAMINATION TYPE: XR shoulder limited LT

 

DATE OF EXAM: 12/12/2022

 

COMPARISON: NONE

 

HISTORY: Closed reduction

 

TECHNIQUE: Three views are submitted.

 

FINDINGS:

The osseous structures are intact.  There is no acute fracture or dislocation.  The AC joint is maint
ained.  Diffuse osteopenia with postsurgical changes. Soft tissue ossification noted.

 

IMPRESSION:

1. Postoperative change. Cannot exclude an anterior subluxation of the humeral head..

## 2022-12-12 NOTE — CT
EXAMINATION TYPE: CT shoulder LT wo con

 

DATE OF EXAM: 12/12/2022

 

COMPARISON: Plain film radiographs from the same day

 

HISTORY: Acute Left shoulder dislocation

 

CT DLP: 251.9 mGycm

 

Unenhanced CT of the left shoulder with reconstruction imaging.

 

TECHNIQUE: Unenhanced CT of the left shoulder was performed with bone and soft tissue window settings
 submitted in the axial coronal and sagittal planes.  At a separate workstation 3-D TR imaging was ob
tained.  

 

FINDINGS:

There is a left humeral head prosthesis noted. There is anterior shoulder dislocation of the prosthet
ic humeral head relative to the glenoid. There is irregularity of the glenoid and chip fracture diffi
cult to exclude. Postoperative soft tissue changes noted with large joint effusion as well as several
 foci of air from recent operative procedure. AC joint is intact. The remainder of the visualized lef
t humerus is grossly intact.

 

 

IMPRESSION:

1. Anterior shoulder dislocation of the prosthetic humeral head relative to the glenoid. As noted the
re is glenoid irregularity and should fractures are difficult to exclude.

## 2022-12-13 RX ADMIN — METOPROLOL TARTRATE SCH MG: 25 TABLET, FILM COATED ORAL at 20:31

## 2022-12-13 RX ADMIN — PANTOPRAZOLE SODIUM SCH MG: 40 TABLET, DELAYED RELEASE ORAL at 05:31

## 2022-12-13 RX ADMIN — METOPROLOL TARTRATE SCH MG: 25 TABLET, FILM COATED ORAL at 09:15

## 2022-12-13 RX ADMIN — Medication SCH MG: at 09:15

## 2022-12-13 RX ADMIN — LOSARTAN POTASSIUM SCH MG: 50 TABLET, FILM COATED ORAL at 09:15

## 2022-12-13 RX ADMIN — ZOLPIDEM TARTRATE PRN MG: 5 TABLET ORAL at 21:41

## 2022-12-13 RX ADMIN — Medication SCH: at 20:30

## 2022-12-13 RX ADMIN — FLUTICASONE PROPIONATE SCH SPRAY: 50 SPRAY, METERED NASAL at 09:16

## 2022-12-13 RX ADMIN — HYDROCODONE BITARTRATE AND ACETAMINOPHEN PRN EACH: 7.5; 325 TABLET ORAL at 14:47

## 2022-12-13 RX ADMIN — SODIUM CHLORIDE TAB 1 GM SCH GM: 1 TAB at 09:15

## 2022-12-13 RX ADMIN — HYDROCODONE BITARTRATE AND ACETAMINOPHEN PRN EACH: 7.5; 325 TABLET ORAL at 09:17

## 2022-12-13 RX ADMIN — HYDROCODONE BITARTRATE AND ACETAMINOPHEN PRN EACH: 7.5; 325 TABLET ORAL at 21:41

## 2022-12-13 RX ADMIN — ASPIRIN SCH MG: 325 TABLET ORAL at 09:16

## 2022-12-13 RX ADMIN — PANTOPRAZOLE SODIUM SCH: 40 TABLET, DELAYED RELEASE ORAL at 19:35

## 2022-12-13 NOTE — P.DS
Providers


Date of admission: 


12/12/22 12:56





Expected date of discharge: 12/13/22


Attending physician: 


Marvel Olivares





Consults: 





                                        





12/11/22 09:26


Consult Physician Routine 


   Consulting Provider: John Stone


   Consult Reason/Comments: medical management


   Do you want consulting provider notified?: Yes











Primary care physician: 


Jem Strange








- Discharge Diagnosis(es)


(1) Dislocation of left shoulder joint


Current Visit: Yes   Status: Acute   





(2) Fall


Current Visit: Yes   Status: Acute   





(3) S/P shoulder hemiarthroplasty


Current Visit: Yes   Status: Acute   


Hospital Course: 


This is a 82-year-old female who is admitted after a fall at inpatient rehab in 

which she landed onto the left shoulder resulting in left shoulder dislocation. 

Patient is status post  left shoulder hemiarthroplasty on 12/06/2022. Patient 

and also developed right hip pain after her fall and has a history of right hip 

replacement. X-rays and CT scans of the left shoulder and right hip were negativ

e for fractures, but did confirm left shoulder dislocation. Informed consent was

obtained to proceed with closed reduction of the left shoulder. Closed reduction

of the left shoulder was attempted on 12/12/2022, but the shoulder easily 

subluxed. Nonoperative vs operative treatment options were discussed at length 

with the patient and her family and it is preferred to continue with 

conservative treatment options at this time. 








Patient is admitted to [facility] on 12/11/2022.  Labs and vital signs 

are stable on day of discharge.





On day of discharge the patient's shoulder incision is healing well.  There is 

minimal erythema.  There is no drainage noted at this time.  There is minimal 

soft tissue swelling to the shoulder.  Patient has full hand and wrist motion 

without difficulty or pain.  Neurovascular status to the left upper extremity is

intact. Patient is discharged to [facility] in good condition. Please see med rec for 

accurate list of home medications.





Patient Condition at Discharge: Stable





Plan - Discharge Summary


Discharge Rx Participant: Yes


New Discharge Prescriptions: 


New


   Sennosides [Senokot] 2 tab PO DAILY PRN #60 tablet


     PRN Reason: Constipation


   HYDROcodone/APAP 7.5-325MG [Norco 7.5-325] 1 - 2 tab PO Q6H PRN #32 tab


     PRN Reason: Pain


   Calcium Carbonate [Tums] 500 mg PO TID PRN  tab


     PRN Reason: Heartburn





Continue


   Losartan Potassium 100 mg PO DAILY


   Celecoxib [CeleBREX] 400 mg PO DAILY


   Tofacitinib Citrate [Xeljanz Xr] 11 mg PO DAILY


   Acetaminophen Tab [Tylenol] 650 mg PO Q6HR PRN  tab


     PRN Reason: Mild Pain Or Fever > 100.5


   Sodium Chloride Tab 1 gm PO DAILY


   Metoprolol Tartrate [Lopressor] 25 mg PO BID


   Bifidobacterium Infantis [Align Chew] 21 mg PO DAILY


   Aspirin EC [Ecotrin] 325 mg PO BID


   Fluticasone Nasal Spray [Flonase Nasal Spray] 1 spr EA NOSTRIL DAILY


   Hempvana Pain Relief Cream 1 applic TOPICAL DAILY PRN


     PRN Reason: Pain


   Docusate [Colace] 100 mg PO BID PRN


     PRN Reason: Constipation


   Nitrofurantoin Monohyd/M-Cryst [Macrobid] 100 mg PO MOWEFR


   Sodium Bicarbonate Tab 650 mg PO BID


   Zolpidem [Ambien] 5 mg PO HS PRN #3 tab


     PRN Reason: Insomnia





Changed


   Omeprazole 20 mg PO BID #0





No Action


   HYDROcodone/APAP 7.5-325MG [Norco 7.5-325] 1 - 2 tab PO Q6H PRN #32 tab


     PRN Reason: Pain


Discharge Medication List





Celecoxib [CeleBREX] 400 mg PO DAILY 01/03/21 [History]


Losartan Potassium 100 mg PO DAILY 01/03/21 [History]


Tofacitinib Citrate [Xeljanz Xr] 11 mg PO DAILY 01/04/21 [History]


Acetaminophen Tab [Tylenol] 650 mg PO Q6HR PRN  tab 01/06/21 [Rx]


Bifidobacterium Infantis [Align Chew] 21 mg PO DAILY 12/02/22 [History]


Metoprolol Tartrate [Lopressor] 25 mg PO BID 12/02/22 [History]


Sodium Chloride Tab 1 gm PO DAILY 12/02/22 [History]


HYDROcodone/APAP 7.5-325MG [Norco 7.5-325] 1 - 2 tab PO Q6H PRN #32 tab 12/08/22

[Rx]


Aspirin EC [Ecotrin] 325 mg PO BID 12/11/22 [History]


Docusate [Colace] 100 mg PO BID PRN 12/11/22 [History]


Fluticasone Nasal Spray [Flonase Nasal Spray] 1 spr EA NOSTRIL DAILY 12/11/22 

[History]


Hempvana Pain Relief Cream 1 applic TOPICAL DAILY PRN 12/11/22 [History]


Nitrofurantoin Monohyd/M-Cryst [Macrobid] 100 mg PO MOWEFR 12/11/22 [History]


Sodium Bicarbonate Tab 650 mg PO BID 12/11/22 [History]


Zolpidem [Ambien] 5 mg PO HS PRN #3 tab 12/12/22 [Rx]


Calcium Carbonate [Tums] 500 mg PO TID PRN  tab 12/13/22 [Rx]


HYDROcodone/APAP 7.5-325MG [Norco 7.5-325] 1 - 2 tab PO Q6H PRN #32 tab 12/13/22

[Rx]


Omeprazole 20 mg PO BID #0 12/13/22 [Rx]


Sennosides [Senokot] 2 tab PO DAILY PRN #60 tablet 12/13/22 [Rx]








Follow up Appointment(s)/Referral(s): 


Jem Strange MD [Primary Care Provider] - 1-2 days


Activity/Diet/Wound Care/Special Instructions: 


Maintain sling. May come out of sling to work on range of motion. 


Daily dressing changes. May shower with incision uncovered.


Please follow-up with [facility] and call with any questions or 

concerns, [phone].


Discharge Disposition: TRANSFER TO SNF/ECF

## 2022-12-13 NOTE — P.PN
Progress Note - Text


Progress Note Date: 12/13/22





- Chief Complaint


Left shoulder pain








Hospital course:


This is a pleasant 82-year-old patient follows with Dr. Strange.  Chronic stable 

medical conditions include interstitial lung disease/gallstones/hiatal 

hernia/essential hypertension/urarthritis.  Patient on December 6 by Dr. Olivares underwent patient underwent shoulder hemiarthroplasty for severe 

osteoarthritis.  On December 8 patient was discharged to ECF.  Patient was going

to the restroom without and took a fall falling on the left side.  Left shoulder

hemiarthroplasty dislocated.  Patient did not want ER to reduce the same.  Left 

arm in a sling..  Appetite fair.  No new fractures.  She was transferred to our 

ER from [facility].


12/12/2022: Patient under today was taken by Dr. Olivares for shoulder 

reduction.  Computed tomography scan following the procedure showed that the 

reduction did not occur.  May have to be left to same day.  CT of the hip-

negative for fracture.


12/13/2022: In bed.  Oral intake fair.  Patient been troubled by increased 

reflux.  Discussed at length diet and position for reflux.  Increase omeprazole 

to twice a day.  Per  patient was to go to a different ECF.





Active Medications





Acetaminophen (Acetaminophen Tab 325 Mg Tab)  650 mg PO Q6HR PRN


   PRN Reason: Mild Pain or Fever > 100.5


   Last Admin: 12/12/22 11:29 Dose:  650 mg


   


Hydrocodone Bitart/Acetaminophen (Hydrocodone/Apap 7.5-325mg 1 Each Tab)  1 - 2 

each PO Q6HR PRN


   PRN Reason: Pain


   Last Admin: 12/13/22 14:47 Dose:  2 each


   


Calcium Carbonate/Glycine (Calcium Carbonate 500 Mg Chewable)  500 mg PO TID PRN


   PRN Reason: Heartburn


Fluticasone Propionate (Fluticasone 50mcg/Spray Nasal 16gm)  1 spray EA NOSTRIL 

DAILY [facility]


   Last Admin: 12/13/22 09:16 Dose:  1 spray


   


Losartan Potassium (Losartan 50 Mg Tab)  100 mg PO DAILY [facility]


   Last Admin: 12/13/22 09:15 Dose:  100 mg


   


Metoprolol Tartrate (Metoprolol Tartrate 25 Mg Tab)  25 mg PO BID [facility]


   Last Admin: 12/13/22 09:15 Dose:  25 mg


   


Naloxone HCl (Naloxone 0.4 Mg/Ml 1 Ml Vial)  0.2 mg IV Q2M PRN


   PRN Reason: Opioid Reversal


Nitrofurantoin Macrocrystals (Nitrofurantoin Monohyd/M-Cryst 100 Mg Cap)  100 mg

PO MOWEFR [facility]; Protocol


   Last Admin: 12/12/22 10:46 Dose:  100 mg


   


Non-Formulary Medication (Tofacitinib Citrate [Xeljanz Xr])  11 mg PO DAILY [facility]


   Last Admin: 12/13/22 09:16 Dose:  11 mg


   


Ondansetron HCl (Ondansetron 4 Mg/2 Ml Vial)  4 mg IVP Q6HR PRN


   PRN Reason: Nausea And Vomiting


   Last Admin: 12/12/22 05:06 Dose:  4 mg


   


Pantoprazole Sodium (Pantoprazole 40 Mg Tablet)  40 mg PO BID [facility]


Sodium Bicarbonate (Sodium Bicarbonate Tab 650 Mg Tab)  650 mg PO BID [facility]


   Last Admin: 12/13/22 09:15 Dose:  650 mg


   


Sodium Chloride (Sodium Chloride Tab 1 Gm Tab)  1 gm PO DAILY [facility]


   Last Admin: 12/13/22 09:15 Dose:  1 gm


   


Zolpidem Tartrate (Zolpidem 5 Mg Tab)  5 mg PO HS PRN


   PRN Reason: Insomnia


   Last Admin: 12/12/22 22:43 Dose:  5 mg


   














Past medical history to include:


GERD, hypertension, rheumatoid arthritis, pulmonary fibrosis osteoarthritis





Social history:


Currently at rehab.  Stop smoking 30 years ago.  Smoked occasionally.  No 

alcohol.





Family history:


Reviewed, noncontributory to presentation





Physical examination:


VITAL SIGNS: At 7.4, 82, 16, 107/56, 96% room air


GENERAL: Reclining in bed, awake comfortable.


EYES: Pupils equal.  Conjunctiva normal.


HEENT: External appearance of nose and ears normal, oral cavity grossly normal.


NECK: JVD not raised; masses not palpable.


HEART: First and second heart sounds are normal;  no edema.  


LUNGS: Respiratory rate normal; fine crackles.  


ABDOMEN: Soft,  nontender, liver spleen not palpable, no masses palpable.  


PSYCH: Alert and oriented x3;  mood  and affect normal.  


MUSCULOSKELETAL:No Clubbing/cyanosis;muscles-grossly intact.  Evidence of RA and

OA.  left arm in a sling





INVESTIGATIONS, reviewed in the clinical context:


White count 6.2 hemoglobin 11.5 platelets 333 sodium 1:30 potassium 4.8 

creatinine 0.47


X-ray of left shoulder [facility] showed left anterior shoulder 

dislocation.





Assessment and plan:





-Anterior shoulder dislocation left, with recent hemiarthroplasty on 12/08/2022 

by Dr. Olivares.  Left arm in a sling


Today correction of dislocations attempted by Dr. Olivares.  Computed tomography

scan of the shoulder post-attempt showed dislocation still to be present.





-Chronic pulmonary fibrosis, from underlying rheumatoid arthritis





-Chronic rheumatoid arthritis


xeljanz, Celebrex





-Primary osteoarthritis


Celebrex





-GERD, uncontrolled


Omeprazole changed to twice a day





-Hyponatremia


Fluid restriction.  Salt tablets





-Essential hypertension


Losartan, Lopressor





-Chronic gait dysfunction, uses a walker at baseline


Fall precautions





Fluid restriction 1500 mL a day.  Increase PPI to twice a day.  Discussed with 

patient.  Other medications to continue.  Cutback aspirin to 80 mg twice a day.


Thank you Dr. Olivares

## 2022-12-14 LAB
ANION GAP SERPL CALC-SCNC: 7 MMOL/L
BUN SERPL-SCNC: 17 MG/DL (ref 7–17)
CALCIUM SPEC-MCNC: 9.6 MG/DL (ref 8.4–10.2)
CHLORIDE SERPL-SCNC: 99 MMOL/L (ref 98–107)
CO2 SERPL-SCNC: 24 MMOL/L (ref 22–30)
GLUCOSE SERPL-MCNC: 90 MG/DL (ref 74–99)
POTASSIUM SERPL-SCNC: 4.6 MMOL/L (ref 3.5–5.1)
SODIUM SERPL-SCNC: 130 MMOL/L (ref 137–145)

## 2022-12-14 RX ADMIN — METOPROLOL TARTRATE SCH MG: 25 TABLET, FILM COATED ORAL at 19:51

## 2022-12-14 RX ADMIN — PANTOPRAZOLE SODIUM SCH MG: 40 TABLET, DELAYED RELEASE ORAL at 06:08

## 2022-12-14 RX ADMIN — NITROFURANTOIN (MONOHYDRATE/MACROCRYSTALS) SCH MG: 75; 25 CAPSULE ORAL at 08:38

## 2022-12-14 RX ADMIN — Medication SCH MG: at 08:38

## 2022-12-14 RX ADMIN — ASPIRIN SCH MG: 325 TABLET ORAL at 08:38

## 2022-12-14 RX ADMIN — HYDROCODONE BITARTRATE AND ACETAMINOPHEN PRN EACH: 7.5; 325 TABLET ORAL at 23:47

## 2022-12-14 RX ADMIN — METOPROLOL TARTRATE SCH MG: 25 TABLET, FILM COATED ORAL at 08:37

## 2022-12-14 RX ADMIN — SODIUM CHLORIDE TAB 1 GM SCH: 1 TAB at 08:38

## 2022-12-14 RX ADMIN — PANTOPRAZOLE SODIUM SCH MG: 40 TABLET, DELAYED RELEASE ORAL at 17:49

## 2022-12-14 RX ADMIN — ZOLPIDEM TARTRATE PRN MG: 5 TABLET ORAL at 19:51

## 2022-12-14 RX ADMIN — HYDROCODONE BITARTRATE AND ACETAMINOPHEN PRN EACH: 7.5; 325 TABLET ORAL at 17:07

## 2022-12-14 RX ADMIN — FLUTICASONE PROPIONATE SCH SPRAY: 50 SPRAY, METERED NASAL at 08:39

## 2022-12-14 RX ADMIN — Medication SCH MG: at 19:51

## 2022-12-14 RX ADMIN — LOSARTAN POTASSIUM SCH MG: 50 TABLET, FILM COATED ORAL at 08:37

## 2022-12-14 RX ADMIN — Medication SCH: at 19:53

## 2022-12-14 NOTE — P.PN
Progress Note - Text


Progress Note Date: 12/14/22





- Chief Complaint


Left shoulder pain








Hospital course:


This is a pleasant 82-year-old patient follows with Dr. Strange.  Chronic stable 

medical conditions include interstitial lung disease/gallstones/hiatal 

hernia/essential hypertension/urarthritis.  Patient on December 6 by Dr. Olivares underwent patient underwent shoulder hemiarthroplasty for severe 

osteoarthritis.  On December 8 patient was discharged to ECF.  Patient was going

to the restroom without and took a fall falling on the left side.  Left shoulder

hemiarthroplasty dislocated.  Patient did not want ER to reduce the same.  Left 

arm in a sling..  Appetite fair.  No new fractures.  She was transferred to our 

ER from [facility].


12/12/2022: Patient under today was taken by Dr. Olivares for shoulder 

reduction.  Computed tomography scan following the procedure showed that the 

reduction did not occur.  May have to be left to same day.  CT of the hip-

negative for fracture.


12/13/2022: In bed.  Oral intake fair.  Patient been troubled by increased 

reflux.  Discussed at length diet and position for reflux.  Increase omeprazole 

to twice a day.  Per  patient was to go to a different ECF.


12/14/2022: In bed.  Comforter.  Oral intake fair.  Pain control.  Pending 

authorization to go to ECF.





Active Medications





Acetaminophen (Acetaminophen Tab 325 Mg Tab)  650 mg PO Q6HR PRN


   PRN Reason: Mild Pain or Fever > 100.5


   Last Admin: 12/12/22 11:29 Dose:  650 mg


   


Hydrocodone Bitart/Acetaminophen (Hydrocodone/Apap 7.5-325mg 1 Each Tab)  1 - 2 

each PO Q6HR PRN


   PRN Reason: Pain


   Last Admin: 12/14/22 17:07 Dose:  2 each


   


Calcium Carbonate/Glycine (Calcium Carbonate 500 Mg Chewable)  500 mg PO TID PRN


   PRN Reason: Heartburn


Fluticasone Propionate (Fluticasone 50mcg/Spray Nasal 16gm)  1 spray EA NOSTRIL 

DAILY [facility]


   Last Admin: 12/14/22 08:39 Dose:  1 spray


   


Losartan Potassium (Losartan 50 Mg Tab)  100 mg PO DAILY [facility]


   Last Admin: 12/14/22 08:37 Dose:  100 mg


   


Metoprolol Tartrate (Metoprolol Tartrate 25 Mg Tab)  25 mg PO BID [facility]


   Last Admin: 12/14/22 08:37 Dose:  25 mg


   


Naloxone HCl (Naloxone 0.4 Mg/Ml 1 Ml Vial)  0.2 mg IV Q2M PRN


   PRN Reason: Opioid Reversal


Nitrofurantoin Macrocrystals (Nitrofurantoin Monohyd/M-Cryst 100 Mg Cap)  100 mg

PO MOWEFR [facility]; Protocol


   Last Admin: 12/14/22 08:38 Dose:  100 mg


   


Non-Formulary Medication (Tofacitinib Citrate [Xeljanz Xr])  11 mg PO DAILY [facility]


   Last Admin: 12/14/22 08:38 Dose:  11 mg


   


Ondansetron HCl (Ondansetron 4 Mg/2 Ml Vial)  4 mg IVP Q6HR PRN


   PRN Reason: Nausea And Vomiting


   Last Admin: 12/12/22 05:06 Dose:  4 mg


   


Pantoprazole Sodium (Pantoprazole 40 Mg Tablet)  40 mg PO BID@0730,1730 [facility]


   Last Admin: 12/14/22 17:49 Dose:  40 mg


   


Sodium Bicarbonate (Sodium Bicarbonate Tab 650 Mg Tab)  650 mg PO BID [facility]


   Last Admin: 12/14/22 08:38 Dose:  650 mg


   


Sodium Chloride (Sodium Chloride Tab 1 Gm Tab)  1 gm PO DAILY [facility]


   Last Admin: 12/14/22 08:38 Dose:  Not Given


   


Zolpidem Tartrate (Zolpidem 5 Mg Tab)  5 mg PO HS PRN


   PRN Reason: Insomnia


   Last Admin: 12/13/22 21:41 Dose:  5 mg


   














Past medical history to include:


GERD, hypertension, rheumatoid arthritis, pulmonary fibrosis osteoarthritis





Social history:


Currently at rehab.  Stop smoking 30 years ago.  Smoked occasionally.  No 

alcohol.





Family history:


Reviewed, noncontributory to presentation





Physical examination:


VITAL SIGNS: 98, 74, 16, 116/62, 98% room air


GENERAL: Reclining in bed, comfortable.


EYES: Pupils equal.  Conjunctiva normal.


HEENT: External appearance of nose and ears normal, oral cavity grossly normal.


NECK: JVD not raised; masses not palpable.


HEART: First and second heart sounds are normal;  no edema.  


LUNGS: Respiratory rate normal; fine crackles.  


ABDOMEN: Soft,  nontender, liver spleen not palpable, no masses palpable.  


PSYCH: Alert and oriented x3;  mood  and affect normal.  


MUSCULOSKELETAL:No Clubbing/cyanosis;muscles-grossly intact.  Evidence of RA and

OA.  left arm in a sling





INVESTIGATIONS, reviewed in the clinical context:


COVID-19: Negative


White count 6.2 hemoglobin 11.5 platelets 333 sodium 1:30 potassium 4.8 

creatinine 0.47


X-ray of left shoulder [facility] showed left anterior shoulder 

dislocation.





Assessment and plan:





-Anterior shoulder dislocation left, with recent hemiarthroplasty on 12/08/2022 

by Dr. Olivares.  Left arm in a sling


correction of dislocations attempted by Dr. Olivares.  Computed tomography scan 

of the shoulder post-attempt showed dislocation still to be present.  For 

conservative management.





-Chronic pulmonary fibrosis, from underlying rheumatoid arthritis





-Chronic rheumatoid arthritis


xeljanz, Celebrex





-Primary osteoarthritis


Celebrex





-GERD, uncontrolled


Omeprazole changed to twice a day





-Hyponatremia


Fluid restriction.  Salt tablets





-Essential hypertension


Losartan, Lopressor





-Chronic gait dysfunction, uses a walker at baseline


Fall precautions





Continue current medication treatment plan.  Pending authorization to go to 

rehab.  Aspirin discontinued.


Thank you Dr. Olivares

## 2022-12-14 NOTE — P.PN
Progress Note - Text


Progress Note Date: 12/14/22


Patient was discharged to rehab yesterday.  However, we are waiting placement.  

Vital signs are stable today.


 I'm hoping we will have arrangements made and she will be discharged to rehab 

later today.

## 2022-12-15 VITALS
HEART RATE: 71 BPM | SYSTOLIC BLOOD PRESSURE: 136 MMHG | DIASTOLIC BLOOD PRESSURE: 77 MMHG | TEMPERATURE: 97.7 F | RESPIRATION RATE: 20 BRPM

## 2022-12-15 RX ADMIN — SODIUM CHLORIDE TAB 1 GM SCH: 1 TAB at 09:51

## 2022-12-15 RX ADMIN — FLUTICASONE PROPIONATE SCH SPRAY: 50 SPRAY, METERED NASAL at 09:49

## 2022-12-15 RX ADMIN — PANTOPRAZOLE SODIUM SCH MG: 40 TABLET, DELAYED RELEASE ORAL at 06:36

## 2022-12-15 RX ADMIN — LOSARTAN POTASSIUM SCH MG: 50 TABLET, FILM COATED ORAL at 09:48

## 2022-12-15 RX ADMIN — SODIUM CHLORIDE TAB 1 GM SCH GM: 1 TAB at 09:48

## 2022-12-15 RX ADMIN — Medication SCH MG: at 09:48

## 2022-12-15 RX ADMIN — ASPIRIN SCH MG: 325 TABLET ORAL at 09:47

## 2022-12-15 RX ADMIN — METOPROLOL TARTRATE SCH MG: 25 TABLET, FILM COATED ORAL at 09:48

## 2022-12-15 NOTE — P.DS
Providers


Date of admission: 


12/12/22 12:56





Expected date of discharge: 12/15/22


Attending physician: 


Marvel Olivares





Consults: 





                                        





12/11/22 09:26


Consult Physician Routine 


   Consulting Provider: John Stone


   Consult Reason/Comments: medical management


   Do you want consulting provider notified?: Yes











Primary care physician: 


Jem Strange








- Discharge Diagnosis(es)


(1) Dislocation of left shoulder joint


Current Visit: Yes   Status: Acute   





(2) Fall


Current Visit: Yes   Status: Acute   





(3) S/P shoulder hemiarthroplasty


Current Visit: Yes   Status: Acute   


Assessment: 


This is a 82-year-old female who is admitted after a fall at inpatient rehab in 

which she landed onto the left shoulder resulting in left shoulder dislocation. 

Patient is status post  left shoulder hemiarthroplasty on 12/06/2022. Patient 

and also developed right hip pain after her fall and has a history of right hip 

replacement. X-rays and CT scans of the left shoulder and right hip were 

negative for fractures, but did confirm left shoulder dislocation. Informed 

consent was obtained to proceed with closed reduction of the left shoulder. 

Closed reduction of the left shoulder was attempted on 12/12/2022, but the 

shoulder easily subluxed. Nonoperative vs operative treatment options were 

discussed at length with the patient and her family and it is preferred to 

continue with conservative treatment options at this time. 








Patient is admitted to [facility] on 12/11/2022.  Labs and vital signs 

are stable on day of discharge.





On day of discharge the patient's shoulder incision is healing well.  There is 

minimal erythema.  There is no drainage noted at this time.  There is minimal 

soft tissue swelling to the shoulder.  Patient has full hand and wrist motion 

without difficulty or pain.  Neurovascular status to the left upper extremity is

intact. Patient is discharged to [facility] in good condition. Please see med rec for 

accurate list of home medications.


Patient Condition at Discharge: Stable





Plan - Discharge Summary


Discharge Rx Participant: Yes


New Discharge Prescriptions: 


New


   Sennosides [Senokot] 2 tab PO DAILY PRN #60 tablet


     PRN Reason: Constipation


   HYDROcodone/APAP 7.5-325MG [Norco 7.5-325] 1 - 2 tab PO Q6H PRN #32 tab


     PRN Reason: Pain


   Calcium Carbonate [Tums] 500 mg PO TID PRN  tab


     PRN Reason: Heartburn





Continue


   Losartan Potassium 100 mg PO DAILY


   Celecoxib [CeleBREX] 400 mg PO DAILY


   Tofacitinib Citrate [Xeljanz Xr] 11 mg PO DAILY


   Acetaminophen Tab [Tylenol] 650 mg PO Q6HR PRN  tab


     PRN Reason: Mild Pain Or Fever > 100.5


   Sodium Chloride Tab 1 gm PO DAILY


   Metoprolol Tartrate [Lopressor] 25 mg PO BID


   Bifidobacterium Infantis [Align Chew] 21 mg PO DAILY


   Fluticasone Nasal Spray [Flonase Nasal Spray] 1 spr EA NOSTRIL DAILY


   Hempvana Pain Relief Cream 1 applic TOPICAL DAILY PRN


     PRN Reason: Pain


   Docusate [Colace] 100 mg PO BID PRN


     PRN Reason: Constipation


   Nitrofurantoin Monohyd/M-Cryst [Macrobid] 100 mg PO MOWEFR


   Sodium Bicarbonate Tab 650 mg PO BID


   Zolpidem [Ambien] 5 mg PO HS PRN #3 tab


     PRN Reason: Insomnia





Discontinued


   Omeprazole 20 mg PO [id number]-[id number]


   Aspirin EC [Ecotrin] 325 mg PO BID


   HYDROcodone/APAP 7.5-325MG [Norco 7.5-325] 1 - 2 tab PO Q6H PRN #32 tab


     PRN Reason: Pain


Discharge Medication List





Celecoxib [CeleBREX] 400 mg PO DAILY 01/03/21 [History]


Losartan Potassium 100 mg PO DAILY 01/03/21 [History]


Tofacitinib Citrate [Xeljanz Xr] 11 mg PO DAILY 01/04/21 [History]


Acetaminophen Tab [Tylenol] 650 mg PO Q6HR PRN  tab 01/06/21 [Rx]


Bifidobacterium Infantis [Align Chew] 21 mg PO DAILY 12/02/22 [History]


Metoprolol Tartrate [Lopressor] 25 mg PO BID 12/02/22 [History]


Sodium Chloride Tab 1 gm PO DAILY 12/02/22 [History]


Docusate [Colace] 100 mg PO BID PRN 12/11/22 [History]


Fluticasone Nasal Spray [Flonase Nasal Spray] 1 spr EA NOSTRIL DAILY 12/11/22 

[History]


Hempvana Pain Relief Cream 1 applic TOPICAL DAILY PRN 12/11/22 [History]


Nitrofurantoin Monohyd/M-Cryst [Macrobid] 100 mg PO MOWEFR 12/11/22 [History]


Sodium Bicarbonate Tab 650 mg PO BID 12/11/22 [History]


Zolpidem [Ambien] 5 mg PO HS PRN #3 tab 12/12/22 [Rx]


Calcium Carbonate [Tums] 500 mg PO TID PRN  tab 12/13/22 [Rx]


HYDROcodone/APAP 7.5-325MG [Norco 7.5-325] 1 - 2 tab PO Q6H PRN #32 tab 12/13/22

[Rx]


Sennosides [Senokot] 2 tab PO DAILY PRN #60 tablet 12/13/22 [Rx]








Follow up Appointment(s)/Referral(s): 


Jem Strange MD [Primary Care Provider] - 1-2 days


Marvel Olivares DO [Doctor of Osteopathic Medicine] - 10 Days


Activity/Diet/Wound Care/Special Instructions: 


Maintain sling. May come out of sling to work on range of motion. 


Daily dressing changes. May shower with incision uncovered.


Please follow-up with [facility] and call with any questions or 

concerns, [phone].


Discharge Disposition: TRANSFER TO SNF/ECF

## 2022-12-15 NOTE — P.PN
Progress Note - Text


Progress Note Date: 12/15/22





- Chief Complaint


Left shoulder pain








Hospital course:


This is a pleasant 82-year-old patient follows with Dr. Strange.  Chronic stable 

medical conditions include interstitial lung disease/gallstones/hiatal 

hernia/essential hypertension/urarthritis.  Patient on December 6 by Dr. Olivares underwent patient underwent shoulder hemiarthroplasty for severe 

osteoarthritis.  On December 8 patient was discharged to ECF.  Patient was going

to the restroom without and took a fall falling on the left side.  Left shoulder

hemiarthroplasty dislocated.  Patient did not want ER to reduce the same.  Left 

arm in a sling..  Appetite fair.  No new fractures.  She was transferred to our 

ER from [facility].


12/12/2022: Patient under today was taken by Dr. Olivares for shoulder 

reduction.  Computed tomography scan following the procedure showed that the 

reduction did not occur.  May have to be left to same day.  CT of the hip-

negative for fracture.


12/13/2022: In bed.  Oral intake fair.  Patient been troubled by increased 

reflux.  Discussed at length diet and position for reflux.  Increase omeprazole 

to twice a day.  Per  patient was to go to a different ECF.


12/14/2022: In bed.  Comforter.  Oral intake fair.  Pain control.  Pending 

authorization to go to ECF.


12/15/2022: No new issues.  Comfortable.  Oral intake fair.  Cleared to rehab 

today.





Current medications reviewed





Past medical history to include:


GERD, hypertension, rheumatoid arthritis, pulmonary fibrosis osteoarthritis





Social history:


Currently at rehab.  Stop smoking 30 years ago.  Smoked occasionally.  No 

alcohol.





Family history:


Reviewed, noncontributory to presentation





Physical examination:


VITAL SIGNS: 97.7, 71, 20, 136 with 77, 94% room air


GENERAL: Reclining in bed, comfortable.


EYES: Pupils equal.  Conjunctiva normal.


HEENT: External appearance of nose and ears normal, oral cavity grossly normal.


NECK: JVD not raised; masses not palpable.


HEART: First and second heart sounds are normal;  no edema.  


LUNGS: Respiratory rate normal; fine crackles.  


ABDOMEN: Soft,  nontender, liver spleen not palpable, no masses palpable.  


PSYCH: Alert and oriented x3;  mood  and affect normal.  


MUSCULOSKELETAL:No Clubbing/cyanosis;muscles-grossly intact.  Evidence of RA and

OA.  left arm in a sling





INVESTIGATIONS, reviewed in the clinical context:


COVID-19: Negative


White count 6.2 hemoglobin 11.5 platelets 333 sodium 1:30 potassium 4.8 

creatinine 0.47


X-ray of left shoulder [facility] showed left anterior shoulder 

dislocation.





Assessment and plan:





-Anterior shoulder dislocation left, with recent hemiarthroplasty on 12/08/2022 

by Dr. Olivares.  Left arm in a sling


correction of dislocations attempted by Dr. Olivares.  Computed tomography scan 

of the shoulder post-attempt showed dislocation still to be present.  For 

conservative management.





-Chronic pulmonary fibrosis, from underlying rheumatoid arthritis





-Chronic rheumatoid arthritis


xeljanz, Celebrex





-Primary osteoarthritis


Celebrex





-GERD, uncontrolled


Omeprazole changed to twice a day





-Hyponatremia


Fluid restriction.  Salt tablets





-Essential hypertension


Losartan, Lopressor





-Chronic gait dysfunction, uses a walker at baseline


Fall precautions





Continue current medication treatment plan.  Weight to rehab today.


Thank you Dr. Olivares

## 2024-12-17 ENCOUNTER — HOSPITAL ENCOUNTER (INPATIENT)
Dept: HOSPITAL 47 - EC | Age: 84
LOS: 7 days | Discharge: NURSING, REHAB OR LONG-TERM CARE | DRG: 640 | End: 2024-12-24
Attending: HOSPITALIST | Admitting: HOSPITALIST
Payer: MEDICARE

## 2024-12-17 VITALS — BODY MASS INDEX: 28.8 KG/M2

## 2024-12-17 DIAGNOSIS — J12.2: ICD-10-CM

## 2024-12-17 DIAGNOSIS — M06.9: ICD-10-CM

## 2024-12-17 DIAGNOSIS — E87.1: Primary | ICD-10-CM

## 2024-12-17 DIAGNOSIS — E86.1: ICD-10-CM

## 2024-12-17 DIAGNOSIS — R09.02: ICD-10-CM

## 2024-12-17 DIAGNOSIS — Z66: ICD-10-CM

## 2024-12-17 DIAGNOSIS — J44.1: ICD-10-CM

## 2024-12-17 DIAGNOSIS — K44.9: ICD-10-CM

## 2024-12-17 DIAGNOSIS — E83.42: ICD-10-CM

## 2024-12-17 DIAGNOSIS — M19.91: ICD-10-CM

## 2024-12-17 DIAGNOSIS — K21.9: ICD-10-CM

## 2024-12-17 DIAGNOSIS — E83.39: ICD-10-CM

## 2024-12-17 DIAGNOSIS — R26.9: ICD-10-CM

## 2024-12-17 DIAGNOSIS — J44.0: ICD-10-CM

## 2024-12-17 DIAGNOSIS — Z87.891: ICD-10-CM

## 2024-12-17 DIAGNOSIS — J84.10: ICD-10-CM

## 2024-12-17 DIAGNOSIS — Z79.899: ICD-10-CM

## 2024-12-17 DIAGNOSIS — I10: ICD-10-CM

## 2024-12-17 DIAGNOSIS — Z60.2: ICD-10-CM

## 2024-12-17 LAB
ALBUMIN SERPL-MCNC: 3.7 G/DL (ref 3.5–5)
ALP SERPL-CCNC: 69 U/L (ref 38–126)
ALT SERPL-CCNC: 16 U/L (ref 4–34)
ANION GAP SERPL CALC-SCNC: 7 MMOL/L
APTT BLD: 19.8 SEC (ref 22–30)
AST SERPL-CCNC: 33 U/L (ref 14–36)
BASOPHILS # BLD AUTO: 0 K/UL (ref 0–0.2)
BASOPHILS NFR BLD AUTO: 0 %
BUN SERPL-SCNC: 14 MG/DL (ref 7–17)
CALCIUM SPEC-MCNC: 8.9 MG/DL (ref 8.4–10.2)
CHLORIDE SERPL-SCNC: 87 MMOL/L (ref 98–107)
CO2 SERPL-SCNC: 25 MMOL/L (ref 22–30)
EOSINOPHIL # BLD AUTO: 0 K/UL (ref 0–0.7)
EOSINOPHIL NFR BLD AUTO: 0 %
ERYTHROCYTE [DISTWIDTH] IN BLOOD BY AUTOMATED COUNT: 3.79 M/UL (ref 3.8–5.4)
ERYTHROCYTE [DISTWIDTH] IN BLOOD: 14.6 % (ref 11.5–15.5)
GLUCOSE SERPL-MCNC: 107 MG/DL (ref 74–99)
HCT VFR BLD AUTO: 33.7 % (ref 34–46)
HGB BLD-MCNC: 11 GM/DL (ref 11.4–16)
INR PPP: 0.9 (ref ?–1.2)
KETONES UR QL STRIP.AUTO: (no result)
LYMPHOCYTES # SPEC AUTO: 0.5 K/UL (ref 1–4.8)
LYMPHOCYTES NFR SPEC AUTO: 7 %
MAGNESIUM SPEC-SCNC: 1.4 MG/DL (ref 1.6–2.3)
MCH RBC QN AUTO: 29.1 PG (ref 25–35)
MCHC RBC AUTO-ENTMCNC: 32.7 G/DL (ref 31–37)
MCV RBC AUTO: 89 FL (ref 80–100)
MONOCYTES # BLD AUTO: 0.7 K/UL (ref 0–1)
MONOCYTES NFR BLD AUTO: 9 %
NEUTROPHILS # BLD AUTO: 6.1 K/UL (ref 1.3–7.7)
NEUTROPHILS NFR BLD AUTO: 82 %
NT-PROBNP SERPL-MCNC: 5090 PG/ML
PH UR: 6.5 [PH] (ref 5–8)
PLATELET # BLD AUTO: 231 K/UL (ref 150–450)
POTASSIUM SERPL-SCNC: 4.2 MMOL/L (ref 3.5–5.1)
PROT SERPL-MCNC: 6.3 G/DL (ref 6.3–8.2)
PROT UR QL: (no result)
PT BLD: 10.2 SEC (ref 10–12.5)
RBC UR QL: 3 /HPF (ref 0–5)
SODIUM SERPL-SCNC: 119 MMOL/L (ref 137–145)
SP GR UR: 1.02 (ref 1–1.03)
UROBILINOGEN UR QL STRIP: <2 MG/DL (ref ?–2)
WBC # BLD AUTO: 7.5 K/UL (ref 3.8–10.6)
WBC #/AREA URNS HPF: 1 /HPF (ref 0–5)

## 2024-12-17 PROCEDURE — 81001 URINALYSIS AUTO W/SCOPE: CPT

## 2024-12-17 PROCEDURE — 84300 ASSAY OF URINE SODIUM: CPT

## 2024-12-17 PROCEDURE — 71045 X-RAY EXAM CHEST 1 VIEW: CPT

## 2024-12-17 PROCEDURE — 80048 BASIC METABOLIC PNL TOTAL CA: CPT

## 2024-12-17 PROCEDURE — 93005 ELECTROCARDIOGRAM TRACING: CPT

## 2024-12-17 PROCEDURE — 83605 ASSAY OF LACTIC ACID: CPT

## 2024-12-17 PROCEDURE — 83735 ASSAY OF MAGNESIUM: CPT

## 2024-12-17 PROCEDURE — 84145 PROCALCITONIN (PCT): CPT

## 2024-12-17 PROCEDURE — 84484 ASSAY OF TROPONIN QUANT: CPT

## 2024-12-17 PROCEDURE — 87636 SARSCOV2 & INF A&B AMP PRB: CPT

## 2024-12-17 PROCEDURE — 96372 THER/PROPH/DIAG INJ SC/IM: CPT

## 2024-12-17 PROCEDURE — 84295 ASSAY OF SERUM SODIUM: CPT

## 2024-12-17 PROCEDURE — 99285 EMERGENCY DEPT VISIT HI MDM: CPT

## 2024-12-17 PROCEDURE — 94760 N-INVAS EAR/PLS OXIMETRY 1: CPT

## 2024-12-17 PROCEDURE — 83930 ASSAY OF BLOOD OSMOLALITY: CPT

## 2024-12-17 PROCEDURE — 83880 ASSAY OF NATRIURETIC PEPTIDE: CPT

## 2024-12-17 PROCEDURE — 84443 ASSAY THYROID STIM HORMONE: CPT

## 2024-12-17 PROCEDURE — 85730 THROMBOPLASTIN TIME PARTIAL: CPT

## 2024-12-17 PROCEDURE — 94640 AIRWAY INHALATION TREATMENT: CPT

## 2024-12-17 PROCEDURE — 80053 COMPREHEN METABOLIC PANEL: CPT

## 2024-12-17 PROCEDURE — 96374 THER/PROPH/DIAG INJ IV PUSH: CPT

## 2024-12-17 PROCEDURE — 96375 TX/PRO/DX INJ NEW DRUG ADDON: CPT

## 2024-12-17 PROCEDURE — 85610 PROTHROMBIN TIME: CPT

## 2024-12-17 PROCEDURE — 85025 COMPLETE CBC W/AUTO DIFF WBC: CPT

## 2024-12-17 PROCEDURE — 83935 ASSAY OF URINE OSMOLALITY: CPT

## 2024-12-17 PROCEDURE — 84100 ASSAY OF PHOSPHORUS: CPT

## 2024-12-17 RX ADMIN — MORPHINE SULFATE STA MG: 2 INJECTION, SOLUTION INTRAMUSCULAR; INTRAVENOUS at 20:51

## 2024-12-17 RX ADMIN — ENOXAPARIN SODIUM SCH MG: 40 INJECTION SUBCUTANEOUS at 20:31

## 2024-12-17 RX ADMIN — MIRTAZAPINE SCH MG: 15 TABLET, FILM COATED ORAL at 20:30

## 2024-12-17 RX ADMIN — BENZONATATE STA MG: 100 CAPSULE ORAL at 20:50

## 2024-12-17 RX ADMIN — NICARDIPINE HYDROCHLORIDE STA MLS/HR: 2.5 INJECTION INTRAVENOUS at 14:42

## 2024-12-17 RX ADMIN — ASPIRIN SCH: 325 TABLET ORAL at 20:37

## 2024-12-17 RX ADMIN — PANTOPRAZOLE SODIUM SCH MG: 40 INJECTION, POWDER, FOR SOLUTION INTRAVENOUS at 16:31

## 2024-12-17 RX ADMIN — METOPROLOL TARTRATE SCH MG: 50 TABLET, FILM COATED ORAL at 20:30

## 2024-12-17 RX ADMIN — CEFAZOLIN STA MLS/HR: 330 INJECTION, POWDER, FOR SOLUTION INTRAMUSCULAR; INTRAVENOUS at 16:30

## 2024-12-17 RX ADMIN — CEFAZOLIN SCH: 330 INJECTION, POWDER, FOR SOLUTION INTRAMUSCULAR; INTRAVENOUS at 19:08

## 2024-12-17 SDOH — SOCIAL STABILITY - SOCIAL INSECURITY: PROBLEMS RELATED TO LIVING ALONE: Z60.2

## 2024-12-17 NOTE — ED
Weakness HPI





- General


Stated complaint: Hyponotremia


Time Seen by Provider: 12/17/24 14:12


Source: RN notes reviewed, old records reviewed, Caregiver


Mode of arrival: EMS


Limitations: no limitations





- History of Present Illness


Initial comments: 





This is a 84-year-old female to the ER for evaluation patient presents today for

evaluation of low sodium altered mental state abnormal lab values, patient has 

had low sodium in the past and been on sodium supplementation pills.  She 

currently is not taking those those that was years ago.  Patient has no recent 

medication changes and does feel weak but states has been drinking a lot of 

water because she felt dehydrated


MD Complaint: generalized weakness (Sodium)


-: unknown


Location: generalized


Severity: severe


Severity scale (1-10): 8


Consistency: constant


Improves with: none


Worsens with: none


Context: history of similar


Associated Symptoms: confusion





- Related Data


                                Home Medications











 Medication  Instructions  Recorded  Confirmed


 


Tofacitinib Citrate [Xeljanz Xr] 11 mg PO AS DIRECTED 01/04/21 12/17/24


 


Metoprolol Tartrate [Lopressor] 50 mg PO BID 12/02/22 12/17/24


 


Fluticasone Nasal Spray [Flonase 1 spr EA NOSTRIL DAILY 12/11/22 12/17/24





Nasal [place]]   


 


Losartan Potassium [Cozaar] 100 mg PO DAILY 12/17/24 12/17/24


 


Mirtazapine [Remeron] 15 mg PO HS 12/17/24 12/17/24


 


busPIRone HCl [Buspar] 5 mg PO BID 12/17/24 12/17/24








                                  Previous Rx's











 Medication  Instructions  Recorded


 


Albuterol Nebulized [Ventolin 2.5 mg INHALATION RT-QID PRN  ml 12/23/24





Nebulized]  


 


Tamsulosin [Flomax] 0.4 mg PO PC-BRKFST #30 cap 12/23/24


 


predniSONE 10 mg PO DAILY 8 Days #20 tab NS 12/23/24











                                    Allergies











Allergy/AdvReac Type Severity Reaction Status Date / Time


 


Tetanus Vaccines and Toxoid Allergy  Unknown Verified 12/17/24 15:10





   Childhood  


 


sulfamethoxazole AdvReac  Nausea & Verified 12/17/24 15:10





[From Bactrim]   Vomiting &  





   Diarrhea  


 


trimethoprim [From Bactrim] AdvReac  Nausea & Verified 12/17/24 15:10





   Vomiting &  





   Diarrhea  














Review of Systems


ROS Statement: 


Those systems with pertinent positive or pertinent negative responses have been 

documented in the HPI.





ROS Other: All systems not noted in ROS Statement are negative.





Past Medical History


Past Medical History: GERD/Reflux, Hypertension, Pneumonia, Rheumatoid Arthritis

(RA)


Additional Past Medical History / Comment(s): hx. of low sodium in the past, 

"goes up & down", unsure why


History of Any Multi-Drug Resistant Organisms: None Reported


Past Surgical History: Appendectomy, Back Surgery, Hysterectomy, Joint 

Replacement, Orthopedic Surgery


Additional Past Surgical History / Comment(s): 3 X Back surgeries, ORIF left 

ankle, right shoulder surg., right hip replaced


Past Anesthesia/Blood Transfusion Reactions: No Reported Reaction


Past Psychological History: No Psychological Hx Reported


Smoking Status: Former smoker


Past Alcohol Use History: None Reported


Past Drug Use History: None Reported





- Past Family History


  ** Father


Family Medical History: No Reported History





General Exam


General appearance: alert, in no apparent distress


Head exam: Present: atraumatic, normocephalic, normal inspection


Eye exam: Present: normal appearance, PERRL, EOMI.  Absent: scleral icterus, 

conjunctival injection, periorbital swelling


ENT exam: Present: normal exam, mucous membranes moist


Neck exam: Present: normal inspection.  Absent: tenderness, meningismus, 

lymphadenopathy


Respiratory exam: Present: normal lung sounds bilaterally.  Absent: respiratory 

distress, wheezes, rales, rhonchi, stridor


Cardiovascular Exam: Present: regular rate, normal rhythm, normal heart sounds. 

Absent: systolic murmur, diastolic murmur, rubs, gallop, clicks


GI/Abdominal exam: Present: soft, normal bowel sounds.  Absent: distended, 

tenderness, guarding, rebound, rigid


Extremities exam: Present: normal inspection, full ROM, normal capillary refill.

 Absent: tenderness, pedal edema, joint swelling, calf tenderness


Back exam: Present: normal inspection


Neurological exam: Present: alert, oriented X3, CN II-XII intact


Psychiatric exam: Present: normal affect, normal mood


Skin exam: Present: warm, dry, intact, normal color.  Absent: rash





Course


                                   Vital Signs











  12/17/24 12/17/24 12/17/24





  14:15 14:34 16:17


 


Temperature 97.7 F  97.7 F


 


Pulse Rate 94  68


 


Respiratory 16 16 16





Rate   


 


Blood Pressure 201/82  201/82


 


O2 Sat by Pulse 95  96





Oximetry   














  12/17/24 12/17/24 12/17/24





  19:04 20:25 21:52


 


Temperature  98.2 F 97.8 F


 


Pulse Rate 63 63 68


 


Respiratory 18 16 20





Rate   


 


Blood Pressure  164/98 184/85


 


O2 Sat by Pulse 98 94 L 94 L





Oximetry   














- Reevaluation(s)


Reevaluation #1: 





12/17/24 14:46


Medical records reviewed


Reevaluation #2: 





12/17/24 14:47


Patient symptoms relatively unchanged


Reevaluation #3: 





12/17/24 14:47


Patient informed of results questions answered


Reevaluation #4: 





Was pt. sent in by a medical professional or institution (, PA, NP, urgent 

care, hospital, or nursing home...) When possible be specific


@  -no


Did you speak to anyone other than the patient for history (EMS, parent, family,

police, friend...)? What history was obtained from this source 


@  -no


Did you review nursing and triage notes (agree or disagree)?  Why? 


@  -agree


Are old charts reviewed (outside hosp., previous admission, EMS record, old EKG,

old radiological studies, urgent care reports/EKG's, nursing home records)? 

Report findings 


@  -yes


Differential Diagnosis (chest pain, altered mental status, abdominal pain women,

abdominal pain men, vaginal bleeding, weakness, fever, dyspnea, syncope, 

headache, dizziness, GI bleed, back pain, seizure, CVA, palpatations, mental 

health, musculoskeletal)? 


@  -prior


EKG interpreted by me (3pts min.).


@  -yes


X-rays interpreted by me (1pt min.).


@  -no


CT interpreted by me (1pt min.).


@  -no


U/S interpreted by me (1pt. min.).


@  -no


What testing was considered but not performed or refused? (CT, X-rays, U/S, 

labs)? Why?


@  -none


What meds were considered but not given or refused? Why?


@  -none


Did you discuss the management of the patient with other professionals 

(professionals i.e. [facility], NP, lab, RT, psych nurse, , , 

teacher, , )? Give summary


@  -no


Was smoking cessation discussed for >3mins.?


@  -no


Was critical care preformed (if so, how long)?


@  -no


Were there social determinants of health that impacted care today? How? 

(Homelessness, low income, unemployed, alcoholism, drug addiction, 

transportation, low edu. Level, literacy, decrease access to med. care, snf, 

rehab)?


@  -none


Was there de-escalation of care discussed even if they declined (Discuss DNR or 

withdrawal of care, Hospice)? DNR status


@  -no


What co-morbidities impacted this encounter? (DM, HTN, Smoking, COPD, CAD, 

Cancer, CVA, ARF, Chemo, Hep., AIDS, mental health diagnosis, sleep apnea, 

morbid obesity)?


@  -none


Was patient admitted / discharged? Hospital course, mention meds given and 

route, prescriptions, significant lab abnormalities, going to OR and other 

pertinent info.


@  - 84 female to ER for evaluation, patient accepted in transfer for low sodium

and will admit for sodium replacement


Admitted


Undiagnosed new problem with uncertain prognosis?


@  -no


Drug Therapy requiring intensive monitoring for toxicity (Heparin, Nitro, 

Insulin, Cardizem)?


@  -no


Were any procedures done?


@  -no


Diagnosis/symptom?


@  -Natremia


Acute, or Chronic, or Acute on Chronic?


@  -Acute


Uncomplicated (without systemic symptoms) or Complicated (systemic symptoms)?


@  -Complicated


Side effects of treatment?


@  -no


Exacerbation, Progression, or Severe Exacerbation?


@  -exacerbation


Poses a threat to life or bodily function? How? (Chest pain, USA, MI, pneumonia,

PE, COPD, DKA, ARF, appy, cholecystitis, CVA, Diverticulitis, Homicidal, 

Suicidal, threat to staff... and all critical care pts)


@  -yes no lab test extremes of age


Reevaluation #5: 





Differential Weakness:


Hypoglycemia, shock, sepsis, hyponatremia, anemia, infection, MI, ETOH, adverse 

medicine reaction, overdose, stroke, this is not meant to be an all-inclusive 

list.








- Consultations


Consultation #1: 





Dr. Stone to admit this patient





EKG Findings





- EKG Comments:


EKG Findings:: EKG is sinus 62  QRS 93 QTc 404





- EKG Results:


EKG: interpreted by ERMD





Medical Decision Making





- Medical Decision Making





84 female to ER for evaluation, patient accepted in transfer for low sodium and 

will admit for sodium replacement





- Lab Data


Result diagrams: 


                                 12/18/24 03:31





                                 12/23/24 03:00





- EKG Data


-: EKG Interpreted by Me





Disposition


Clinical Impression: 


 Weakness, Hyponatremia





Disposition: ADMITTED AS IP TO THIS HOSP


Condition: Good


Is patient prescribed a controlled substance at d/c from ED?: No


Time of Disposition: 14:30

## 2024-12-18 LAB
ALBUMIN SERPL-MCNC: 3.4 G/DL (ref 3.5–5)
ALBUMIN/GLOB SERPL: 1.2 {RATIO}
ALP SERPL-CCNC: 68 U/L (ref 38–126)
ALT SERPL-CCNC: 17 U/L (ref 4–34)
ANION GAP SERPL CALC-SCNC: 3 MMOL/L
AST SERPL-CCNC: 41 U/L (ref 14–36)
BASOPHILS # BLD AUTO: 0.01 X 10*3/UL (ref 0–0.1)
BASOPHILS NFR BLD AUTO: 0.2 %
BUN SERPL-SCNC: 11 MG/DL (ref 7–17)
CALCIUM SPEC-MCNC: 8.7 MG/DL (ref 8.4–10.2)
CHLORIDE SERPL-SCNC: 91 MMOL/L (ref 98–107)
CO2 SERPL-SCNC: 25 MMOL/L (ref 22–30)
EOSINOPHIL # BLD AUTO: 0.04 X 10*3/UL (ref 0.04–0.35)
EOSINOPHIL NFR BLD AUTO: 0.7 %
ERYTHROCYTE [DISTWIDTH] IN BLOOD BY AUTOMATED COUNT: 3.65 X 10*6/UL (ref 4.1–5.2)
ERYTHROCYTE [DISTWIDTH] IN BLOOD: 14.7 % (ref 11.5–14.5)
GLOBULIN SER CALC-MCNC: 2.8 G/DL
GLUCOSE SERPL-MCNC: 93 MG/DL (ref 74–99)
HCT VFR BLD AUTO: 31.8 % (ref 37.2–46.3)
HGB BLD-MCNC: 10.3 G/DL (ref 12–15)
IMM GRANULOCYTES BLD QL AUTO: 0.3 %
LYMPHOCYTES # SPEC AUTO: 0.45 X 10*3/UL (ref 0.9–5)
LYMPHOCYTES NFR SPEC AUTO: 7.6 %
MAGNESIUM SPEC-SCNC: 1.4 MG/DL (ref 1.6–2.3)
MCH RBC QN AUTO: 28.2 PG (ref 27–32)
MCHC RBC AUTO-ENTMCNC: 32.4 G/DL (ref 32–37)
MCV RBC AUTO: 87.1 FL (ref 80–97)
MONOCYTES # BLD AUTO: 0.88 X 10*3/UL (ref 0.2–1)
MONOCYTES NFR BLD AUTO: 14.8 %
NEUTROPHILS # BLD AUTO: 4.53 X 10*3/UL (ref 1.8–7.7)
NEUTROPHILS NFR BLD AUTO: 76.4 %
NRBC BLD AUTO-RTO: 0 X 10*3/UL (ref 0–0.01)
PLATELET # BLD AUTO: 240 X 10*3/UL (ref 140–440)
POTASSIUM SERPL-SCNC: 3.9 MMOL/L (ref 3.5–5.1)
PROT SERPL-MCNC: 6.2 G/DL (ref 6.3–8.2)
SODIUM SERPL-SCNC: 119 MMOL/L (ref 137–145)
SODIUM SERPL-SCNC: 120 MMOL/L (ref 137–145)
WBC # BLD AUTO: 5.93 X 10*3/UL (ref 4.5–10)

## 2024-12-18 RX ADMIN — FUROSEMIDE ONE MG: 10 INJECTION, SOLUTION INTRAMUSCULAR; INTRAVENOUS at 16:50

## 2024-12-18 RX ADMIN — ISODIUM CHLORIDE SCH: 0.03 SOLUTION RESPIRATORY (INHALATION) at 21:11

## 2024-12-18 RX ADMIN — METHYLPREDNISOLONE SODIUM SUCCINATE SCH MG: 40 INJECTION, POWDER, FOR SOLUTION INTRAMUSCULAR; INTRAVENOUS at 21:43

## 2024-12-18 RX ADMIN — TAMSULOSIN HYDROCHLORIDE SCH MG: 0.4 CAPSULE ORAL at 16:49

## 2024-12-18 RX ADMIN — MELOXICAM SCH MG: 7.5 TABLET ORAL at 12:00

## 2024-12-18 RX ADMIN — LOSARTAN POTASSIUM SCH MG: 50 TABLET, FILM COATED ORAL at 12:00

## 2024-12-18 RX ADMIN — TOLVAPTAN ONE MG: 15 TABLET ORAL at 23:54

## 2024-12-18 RX ADMIN — LEUCINE, PHENYLALANINE, LYSINE, METHIONINE, ISOLEUCINE, VALINE, HISTIDINE, THREONINE, TRYPTOPHAN, ALANINE, GLYCINE, ARGININE, PROLINE, SERINE, TYROSINE, DEXTROSE ONE MLS/HR: 365; 280; 290; 200; 300; 290; 240; 210; 90; 1035; 515; 575; 340; 250; 20; 20 INJECTION INTRAVENOUS at 18:20

## 2024-12-18 RX ADMIN — MAGNESIUM SULFATE IN DEXTROSE SCH MLS/HR: 10 INJECTION, SOLUTION INTRAVENOUS at 17:01

## 2024-12-18 RX ADMIN — GUAIFENESIN AND DEXTROMETHORPHAN HYDROBROMIDE PRN EACH: 600; 30 TABLET, EXTENDED RELEASE ORAL at 21:43

## 2024-12-18 NOTE — P.NPCON
History of Present Illness





- Reason for Consult


hyponatremia





- History of Present Illness





Reason for consultation: Hyponatremia





History of present illness:


Patient is 84-year-old female seen in renal consultation for hyponatremia.  

Patient was transferred from another facility due to low sodium levels.  Patient

will states she was recently treated for UTI with Cipro which she completed 

yesterday.  On Friday patient developed a cough with clear phlegm and 

generalized weakness for which she went to the hospital.  Patient states she 

tested positive for flu.  She has been receiving IV fluids with normal saline at

75 cc an hour with no improvement in sodium level.  Sodium level 120 this 

morning.  She denies use of diuretics.  Denies history of malignancy.  She does 

admit to drinking about 3 bottles of water daily.  Oral intake has been poor the

last few days.  She does admit to generalized pain.  Patient has history of 

rheumatoid arthritis.  I do see Celebrex on her home medication list but is un

clear how often she actually takes this.  Blood pressure is on the higher side. 

No history of kidney disease.  GFR is at baseline.





Vital signs are stable.


General: No acute distress.


HEENT: Head exam is unremarkable.  On room air.


LUNGS: No audible rhonchi or wheezes.


HEART: Rate and Rhythm are regular.


ABDOMEN: Nontender.


EXTREMITITES: No edema.





Past Medical History


Past Medical History: GERD/Reflux, Hypertension, Pneumonia, Rheumatoid Arthritis

(RA)


Additional Past Medical History / Comment(s): hx. of low sodium in the past, 

"goes up & down", unsure why.


History of Any Multi-Drug Resistant Organisms: None Reported


Past Surgical History: Appendectomy, Back Surgery, Hysterectomy, Joint 

Replacement, Orthopedic Surgery


Additional Past Surgical History / Comment(s): 3 X Back surgeries, ORIF left 

ankle, right shoulder surg., right hip replaced.


Past Anesthesia/Blood Transfusion Reactions: No Reported Reaction


Past Psychological History: No Psychological Hx Reported


Smoking Status: Former smoker


Past Alcohol Use History: None Reported


Additional Past Alcohol Use History / Comment(s): quit smoking 30 yrs. ago, 

smoked occasionally, not every day


Past Drug Use History: None Reported





- Past Family History


  ** Father


Family Medical History: No Reported History





Medications and Allergies


                                Home Medications











 Medication  Instructions  Recorded  Confirmed  Type


 


Celecoxib [CeleBREX] 400 mg PO DAILY 01/03/21 12/17/24 History


 


Tofacitinib Citrate [Xeljanz Xr] 11 mg PO AS DIRECTED 01/04/21 12/17/24 History


 


Metoprolol Tartrate [Lopressor] 50 mg PO BID 12/02/22 12/17/24 History


 


Fluticasone Nasal Spray [Flonase 1 spr EA NOSTRIL DAILY 12/11/22 12/17/24 

History





Nasal Spray]    


 


Ciprofloxacin HCl [Cipro] 500 mg PO Q12HR 12/17/24 12/17/24 History


 


Losartan Potassium [Cozaar] 100 mg PO DAILY 12/17/24 12/17/24 History


 


Mirtazapine [Remeron] 15 mg PO HS 12/17/24 12/17/24 History


 


busPIRone HCl [Buspar] 5 mg PO BID 12/17/24 12/17/24 History








                                    Allergies











Allergy/AdvReac Type Severity Reaction Status Date / Time


 


Tetanus Vaccines and Toxoid Allergy  Unknown Verified 12/17/24 15:10





   Childhood  


 


sulfamethoxazole AdvReac  Nausea & Verified 12/17/24 15:10





[From Bactrim]   Vomiting &  





   Diarrhea  


 


trimethoprim [From Bactrim] AdvReac  Nausea & Verified 12/17/24 15:10





   Vomiting &  





   Diarrhea  














Physical Exam


Vitals: 


                                   Vital Signs











  Temp Pulse Pulse Resp BP BP Pulse Ox


 


 12/18/24 07:51  98.1 F   70  17   169/86  93 L


 


 12/18/24 01:25  97.8 F   66  20   190/85  93 L


 


 12/17/24 21:52  97.8 F  68   20  184/85   94 L


 


 12/17/24 20:25  98.2 F  63   16  164/98   94 L


 


 12/17/24 19:04   63   18    98


 


 12/17/24 16:17  97.7 F  68   16  201/82   96


 


 12/17/24 14:34     16   


 


 12/17/24 14:15  97.7 F  94   16  201/82   95








                                Intake and Output











 12/17/24 12/18/24 12/18/24





 22:59 06:59 14:59


 


Intake Total  240 


 


Output Total  600 


 


Balance  -360 


 


Intake:   


 


  Oral  240 


 


Output:   


 


  Urine  600 


 


    Straight  600 


 


Other:   


 


  Voiding Method Bedside Commode  


 


  # Voids  2 1


 


  Weight  86.183 kg 














Results





- Lab Results


                             Most recent lab results











Calcium  8.7 mg/dL (8.4-10.2)   12/18/24  03:31    


 


Phosphorus  2.1 mg/dL (2.5-4.5)  L  12/18/24  03:31    


 


Magnesium  1.4 mg/dL (1.6-2.3)  L  12/18/24  03:31    














                                 12/18/24 03:31





                                 12/18/24 12:23





Assessment and Plan


Plan: 





Assessment:


1.  Hyponatremia.  Appears euvolemic.  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

poor solute intake and NSAID use (received mobic this admission).  No 

improvement in sodium level post IV fluids.  Sodium level 120 this morning.  TSH

normal.  Serum osmolality 258.


2.  Benign hypertension.  Exacerbated by pain.


3.  Hypomagnesemia from poor intake.


4.  Hypophosphatemia from poor intake.


5.  Urinary retention.  Required straight catheterization once overnight.





Plan:


Hep-Lock IV fluids.


Maintain 1500 cc fluid restriction.


Encouraged oral intake.


Monitor bladder scans closely.  Insert Wood catheter if persistently retaining 

over 300 cc urine.


Avoid NSAIDs.


Check urine osmolality and urine sodium level.


Lasix 20 mg IV once now.


Add Flomax.


Repeat sodium level this evening.


Repeat magnesium and phosphorus.





Thank you for the consultation.  I will continue to follow the patient with you 

during her hospital stay.

## 2024-12-18 NOTE — XR
EXAMINATION TYPE: XR chest 1V portable

 

DATE OF EXAM: 12/18/2024

 

COMPARISON: NONE

 

CLINICAL INDICATION: Female, 84 years old with history of Influenza.  Pulmonary fibrosis;

 

TECHNIQUE: Single frontal view of the chest is obtained.

 

FINDINGS:  Prominent interstitial markings seen throughout both lung fields likely reflective of bila
teral or atypical pneumonia. Correlate clinically. The cardiac silhouette size is within normal limit
s.   The osseous structures are intact.

 

IMPRESSION:  Prominent interstitial markings seen throughout both lung fields likely reflective of bi
lateral or atypical pneumonia. Correlate clinically.

 

[facility], Workstation: [id number], 12/18/2024 8:11 PM

## 2024-12-18 NOTE — P.HPIM
History of Present Illness


H&P Date: 12/18/24


Chief Complaint: Short of breath





Pleasant 84-year-old patient follows with Dr. Strange.  Chronic stable medical 

conditions include interstitial lung disease/hiatal hernia/essential 

hypertension/rheumatoid arthritis.


Patient will present 1 week of increasing cough.  Some clear sputum.  Decreased 

appetite.  Tired rundown.  Not able to sleep well.  Patient is transferred from 

[facility].  Patient has low sodium and also tested positive for 

influenza at the transferring hospital.  Patient also complains of joint pains. 

Normally at home she just takes Tylenol.  Some flareup with her getting sick.  

Patient not been eating much.  Though she drinks quite a bit of water.  

Patient's son and daughter at the bedside.  Just completed a course of 

antibiotic for UTI.





Review of systems:


GEN.:  Tired, decreased appetite


EYES: None


HEENT: None


NECK: None


RESPIRATORY: As above


CARDIOVASCULAR: None


GASTROINTESTINAL: None


GENITOURINARY: None


MUSCULOSKELETAL: Joint pains


LYMPHATICS: None


HEMATOLOGICAL: None  


PSYCHIATRY: None


NEUROLOGICAL: Uses a walker














Past medical history to include:


GERD, hypertension, rheumatoid arthritis, pulmonary fibrosis osteoarthritis





Social history:


Lives alone.  Stop smoking, over 30 years ago.  Smoked occasionally.  No 

alcohol.











Physical examination:


VITAL SIGNS: 98.1, 70, 22, 169 x 86, 93% room air


GENERAL: Reclining in bed, coughing, short of breath.


EYES: Pupils equal.  Conjunctiva normal.


HEENT: External appearance of nose and ears normal, oral cavity grossly normal.


NECK: JVD not raised; masses not palpable.


HEART: First and second heart sounds are normal;  no edema.  


LUNGS: Respiratory rate increased, fine crackles, expiratory wheezing


ABDOMEN: Soft,  nontender, liver spleen not palpable, no masses palpable.  


PSYCH: Alert and oriented x3;  mood  and affect anxious.  


MUSCULOSKELETAL:No Clubbing/cyanosis;muscles-grossly intact.  Evidence of RA and

OA.


NEUROLOGICAL: Cranial nerves grossly intact; no facial asymmetry,   power and 

sensation grossly intact. 


LYMPHATICS: No lymph nodes palpable in the axilla and neck





INVESTIGATIONS, reviewed in the clinical context:


December 18: White count 5.9 hemoglobin 10.3 platelets 240 sodium 119 potassium 

3.9 creatinine 0.52 serum osmolality 257 phosphorus 2.1 magnesium 1.4 TSH 0.79


December 17: Sodium 119 potassium 4.2 creatinine 0.5


EKG tracing personally reviewed by me-normal sinus rhythm





Assessment and plan:





-Acute influenza pneumonitis, causing hypoxia, pulse ox 93%





-Element of COPD exacerbation in a prior smoker with underlying pulmonary 

fibrosis


IV Solu-Medrol.  Bronchodilator


Incentive spirometry








-Chronic pulmonary fibrosis, from underlying rheumatoid arthritis





-Chronic rheumatoid arthritis, acute flareup from underlying infection decreased

mobility


xeljanz-hold for now, Celebrex


Add Ultram 50 mg every 8 as needed








-Primary osteoarthritis


Celebrex








-Severe hyponatremia, likely hypovolemia and hypoosmolar will decrease food 

intake and increase water intake


Fluid restriction.  IV fluids





-Essential hypertension


Losartan, Lopressor





-Chronic gait dysfunction, uses a walker at baseline


Fall precautions





-DNR





-Patient's daughter Rivka, medical POA





Consultation with nephrology pulmonary.  Care was discussed with the patient.





Past Medical History


Past Medical History: GERD/Reflux, Hypertension, Pneumonia, Rheumatoid Arthritis

(RA)


Additional Past Medical History / Comment(s): hx. of low sodium in the past, 

"goes up & down", unsure why.


History of Any Multi-Drug Resistant Organisms: None Reported


Past Surgical History: Appendectomy, Back Surgery, Hysterectomy, Joint 

Replacement, Orthopedic Surgery


Additional Past Surgical History / Comment(s): 3 X Back surgeries, ORIF left 

ankle, right shoulder surg., right hip replaced.


Past Anesthesia/Blood Transfusion Reactions: No Reported Reaction


Past Psychological History: No Psychological Hx Reported


Smoking Status: Former smoker


Past Alcohol Use History: None Reported


Additional Past Alcohol Use History / Comment(s): quit smoking 30 yrs. ago, 

smoked occasionally, not every day


Past Drug Use History: None Reported





- Past Family History


  ** Father


Family Medical History: No Reported History





Medications and Allergies


                                Home Medications











 Medication  Instructions  Recorded  Confirmed  Type


 


Celecoxib [CeleBREX] 400 mg PO DAILY 01/03/21 12/17/24 History


 


Tofacitinib Citrate [Xeljanz Xr] 11 mg PO AS DIRECTED 01/04/21 12/17/24 History


 


Metoprolol Tartrate [Lopressor] 50 mg PO BID 12/02/22 12/17/24 History


 


Fluticasone Nasal Spray [Flonase 1 spr EA NOSTRIL DAILY 12/11/22 12/17/24 

History





Nasal Spray]    


 


Ciprofloxacin HCl [Cipro] 500 mg PO Q12HR 12/17/24 12/17/24 History


 


Losartan Potassium [Cozaar] 100 mg PO DAILY 12/17/24 12/17/24 History


 


Mirtazapine [Remeron] 15 mg PO HS 12/17/24 12/17/24 History


 


busPIRone HCl [Buspar] 5 mg PO BID 12/17/24 12/17/24 History








                                    Allergies











Allergy/AdvReac Type Severity Reaction Status Date / Time


 


Tetanus Vaccines and Toxoid Allergy  Unknown Verified 12/17/24 15:10





   Childhood  


 


sulfamethoxazole AdvReac  Nausea & Verified 12/17/24 15:10





[From Bactrim]   Vomiting &  





   Diarrhea  


 


trimethoprim [From Bactrim] AdvReac  Nausea & Verified 12/17/24 15:10





   Vomiting &  





   Diarrhea  














Physical Exam


Vitals: 


                                   Vital Signs











  Temp Pulse Pulse Resp BP BP Pulse Ox


 


 12/18/24 07:51  98.1 F   70  17   169/86  93 L


 


 12/18/24 01:25  97.8 F   66  20   190/85  93 L


 


 12/17/24 21:52  97.8 F  68   20  184/85   94 L


 


 12/17/24 20:25  98.2 F  63   16  164/98   94 L


 


 12/17/24 19:04   63   18    98


 


 12/17/24 16:17  97.7 F  68   16  201/82   96


 


 12/17/24 14:34     16   


 


 12/17/24 14:15  97.7 F  94   16  201/82   95








                                Intake and Output











 12/17/24 12/18/24 12/18/24





 22:59 06:59 14:59


 


Intake Total  240 


 


Output Total  600 


 


Balance  -360 


 


Intake:   


 


  Oral  240 


 


Output:   


 


  Urine  600 


 


    Straight  600 


 


Other:   


 


  Voiding Method Bedside Commode  


 


  # Voids  2 1


 


  Weight  86.183 kg 














Results


CBC & Chem 7: 


                                 12/18/24 03:31





                                 12/18/24 17:47


Labs: 


                  Abnormal Lab Results - Last 24 Hours (Table)











  12/17/24 12/17/24 12/17/24 Range/Units





  14:53 14:53 14:53 


 


RBC  3.79 L    (3.80-5.40)  m/uL


 


Hgb  11.0 L    (11.4-16.0)  gm/dL


 


Hct  33.7 L    (34.0-46.0)  %


 


RDW     (11.5-14.5)  %


 


Lymphocytes #  0.5 L    (1.0-4.8)  k/uL


 


APTT   19.8 L   (22.0-30.0)  sec


 


Sodium    119 L*  (137-145)  mmol/L


 


Chloride    87 L  ()  mmol/L


 


Glucose    107 H  (74-99)  mg/dL


 


Osmolality     (275-295)  mOsm/kg


 


Phosphorus    2.3 L  (2.5-4.5)  mg/dL


 


Magnesium    1.4 L  (1.6-2.3)  mg/dL


 


AST     (14-36)  U/L


 


Total Protein     (6.3-8.2)  g/dL


 


Albumin     (3.5-5.0)  g/dL


 


Urine Protein     (Negative)  


 


Urine Glucose (UA)     (Negative)  


 


Urine Ketones     (Negative)  


 


Urine Mucus     (None)  /hpf














  12/17/24 12/17/24 12/18/24 Range/Units





  14:53 20:49 03:31 


 


RBC     (3.80-5.40)  m/uL


 


Hgb     (11.4-16.0)  gm/dL


 


Hct     (34.0-46.0)  %


 


RDW     (11.5-14.5)  %


 


Lymphocytes #     (1.0-4.8)  k/uL


 


APTT     (22.0-30.0)  sec


 


Sodium    119 L*  (137-145)  mmol/L


 


Chloride    91 L  ()  mmol/L


 


Glucose     (74-99)  mg/dL


 


Osmolality   257 L   (275-295)  mOsm/kg


 


Phosphorus    2.1 L  (2.5-4.5)  mg/dL


 


Magnesium    1.4 L  (1.6-2.3)  mg/dL


 


AST    41 H  (14-36)  U/L


 


Total Protein    6.2 L  (6.3-8.2)  g/dL


 


Albumin    3.4 L  (3.5-5.0)  g/dL


 


Urine Protein  1+ H    (Negative)  


 


Urine Glucose (UA)  Trace H    (Negative)  


 


Urine Ketones  1+ H    (Negative)  


 


Urine Mucus  Rare H    (None)  /hpf














  12/18/24 12/18/24 Range/Units





  03:31 07:01 


 


RBC  3.65 L   (3.80-5.40)  m/uL


 


Hgb  10.3 L   (11.4-16.0)  gm/dL


 


Hct  31.8 L   (34.0-46.0)  %


 


RDW  14.7 H   (11.5-14.5)  %


 


Lymphocytes #  0.45 L   (1.0-4.8)  k/uL


 


APTT    (22.0-30.0)  sec


 


Sodium   120 L  (137-145)  mmol/L


 


Chloride    ()  mmol/L


 


Glucose    (74-99)  mg/dL


 


Osmolality    (275-295)  mOsm/kg


 


Phosphorus    (2.5-4.5)  mg/dL


 


Magnesium    (1.6-2.3)  mg/dL


 


AST    (14-36)  U/L


 


Total Protein    (6.3-8.2)  g/dL


 


Albumin    (3.5-5.0)  g/dL


 


Urine Protein    (Negative)  


 


Urine Glucose (UA)    (Negative)  


 


Urine Ketones    (Negative)  


 


Urine Mucus    (None)  /hpf

## 2024-12-19 LAB
ANION GAP SERPL CALC-SCNC: 12 MMOL/L (ref 4–12)
BUN SERPL-SCNC: 9.1 MG/DL (ref 9–27)
BUN/CREAT SERPL: 18.2 RATIO (ref 12–20)
CALCIUM SPEC-MCNC: 8.7 MG/DL (ref 8.7–10.3)
CHLORIDE SERPL-SCNC: 84 MMOL/L (ref 96–109)
CO2 SERPL-SCNC: 23 MMOL/L (ref 21.6–31.8)
GLUCOSE SERPL-MCNC: 147 MG/DL (ref 70–110)
MAGNESIUM SPEC-SCNC: 1.6 MG/DL (ref 1.5–2.4)
POTASSIUM SERPL-SCNC: 4 MMOL/L (ref 3.5–5.5)
SODIUM SERPL-SCNC: 119 MMOL/L (ref 135–145)

## 2024-12-19 RX ADMIN — FAMOTIDINE SCH MG: 20 TABLET, FILM COATED ORAL at 14:51

## 2024-12-19 RX ADMIN — METHYLPREDNISOLONE SODIUM SUCCINATE SCH MG: 40 INJECTION, POWDER, FOR SOLUTION INTRAMUSCULAR; INTRAVENOUS at 20:05

## 2024-12-19 RX ADMIN — Medication SCH MG: at 11:33

## 2024-12-19 RX ADMIN — CALCIUM CARBONATE (ANTACID) CHEW TAB 500 MG SCH MG: 500 CHEW TAB at 14:51

## 2024-12-19 RX ADMIN — TOLVAPTAN STA MG: 15 TABLET ORAL at 18:46

## 2024-12-19 RX ADMIN — TOLVAPTAN ONE MG: 15 TABLET ORAL at 11:37

## 2024-12-19 RX ADMIN — CALCIUM CARBONATE (ANTACID) CHEW TAB 500 MG PRN MG: 500 CHEW TAB at 10:08

## 2024-12-19 NOTE — P.PN
Subjective





Patient is seen in follow-up for hyponatremia.  Received Samsca last night.  

Sodium level 119 this morning.  Oral intake is poor.  Has been voiding.





Vital signs are stable.


General: No acute distress.


HEENT: Head exam is unremarkable. 


LUNGS: No audible rhonchi or wheezes.


HEART: Rate and Rhythm are regular. 


ABDOMEN: Nontender.


EXTREMITITES: No edema.





Objective





- Vital Signs


Vital signs: 


                                   Vital Signs











Temp  97.9 F   12/19/24 08:00


 


Pulse  65   12/19/24 08:00


 


Resp  18   12/19/24 08:00


 


BP  170/80   12/19/24 08:00


 


Pulse Ox  94 L  12/19/24 08:00


 


FiO2      








                                 Intake & Output











 12/18/24 12/19/24 12/19/24





 18:59 06:59 18:59


 


Intake Total 600 240 


 


Balance 600 240 


 


Intake:   


 


  Oral 600 240 


 


Other:   


 


  Voiding Method  Bedside Commode 


 


  # Voids 1 2 4














- Labs


CBC & Chem 7: 


                                 12/18/24 03:31





                                 12/19/24 03:27


Labs: 


                  Abnormal Lab Results - Last 24 Hours (Table)











  12/18/24 12/18/24 12/18/24 Range/Units





  07:01 12:23 17:47 


 


Sodium   120 L  118 L*  (137-145)  mmol/L


 


Chloride     ()  mmol/L


 


Creatinine     (0.6-1.5)  mg/dL


 


Glucose     ()  mg/dL


 


Osmolality  258 L    (275-295)  mOsm/kg














  12/19/24 Range/Units





  03:27 


 


Sodium  119 A*  (137-145)  mmol/L


 


Chloride  84 L  ()  mmol/L


 


Creatinine  0.5 L  (0.6-1.5)  mg/dL


 


Glucose  147 H  ()  mg/dL


 


Osmolality   (275-295)  mOsm/kg














Assessment and Plan


Plan: 





Assessment:


1.  Hyponatremia.  Appears euvolemic.  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

poor solute intake and NSAID use (received mobic this admission).  No 

improvement in sodium level post IV fluids.  Status post Lasix and Samsca 

yesterday.  Sodium level 119 this morning.  TSH normal.  Serum osmolality 258.  

Urine sodium 73 and urine osmolality 528.


2.  Benign hypertension.  Exacerbated by pain and steroids.


3.  Hypomagnesemia from poor intake.  Replaced.  Better.


4.  Hypophosphatemia from poor intake.


5.  Urinary retention.  Required straight catheterization once.  On Flomax.


6.  Influenza A infection.





Plan:


Repeat Samsca today.


Urea 15 g once today.


Check sodium level this evening.  


Maintain 1500 cc fluid restriction.


Encouraged oral intake.


Monitor bladder scans closely.  Insert Wood catheter if persistently retaining 

over 300 cc urine.


Avoid NSAIDs.


Add oral magnesium oxide.

## 2024-12-19 NOTE — P.CNPUL
History of Present Illness


Consult date: 12/19/24


Requesting physician: John Stone


Reason for consult: dyspnea


Chief complaint: URI-like symptoms


History of present illness: 





Patient is 84-year-old female with past medical history significant for 

hypertension, rheumatoid arthritis, and remote history of tobacco use over 50 

years ago.  Patient was transferred from [facility] for hyponatremia.  

Over the last  2 weeks patient has had URI-like symptoms including nasal 

congestion, sore throat, and cough.  Cough has become more congested and coarse.

Mostly nonproductive. Denies any fevers, significant sputum production, chest 

pain, hemoptysis.  Denies nausea or vomiting.  Some small amounts of self-

limiting loose stools.  Appetite has been poor.  Viral screen at [facility] positive for parainfluenza virus.  Also, her sodium level was noted to 

be low.  She was transferred to [facility] for nephrology 

consultation.  Sodium noted to be 119 on arrival to our facility.  Patient was 

admitted to the general medical floor.  Has not been started on any hypertonic 

saline.  She is alert and oriented.  No seizure activity.  She states that her 

sodium has been low in the past, and was previously was on sodium tabs.  She 

states that she has been drinking about 3 bottles of water per day due to her 

recent illness.  She appears euvolemic.  No SSRIs. Does take mirtazapine and 

NSAIDs.  Denies diuretic use.  She has a coarse congested cough.  Nonproductive.

 She is on room air.  Denies any pre-existing lung disease.  Not in any 

respiratory distress..Chest x-ray showing bilateral interstitial densities 

consistent with possible atypical pneumonia.  No obvious focal densities or 

masses.  CBC: WBC count 5.9, hemoglobin 10.3, hematocrit 31.8, platelets 240.  

CMP: Sodium 118, potassium 3.9, chloride 91, serum bicarb 25, BUN 11, creatinine

0.52, glucose 93.  LFTs unremarkable.  Magnesium 1.4.  TSH 0.792.  Serum 

osmolality 258.  Urine sodium 73.  Urine osmolality pending.  Nephrology is 

managing.  Current vitals: Temperature 98.1 F, heart rate 64 bpm, blood 

pressure 161/69 mmHg, nontachypneic, SpO2 93% on room air.





Review of Systems


Constitutional: Reports fatigue, Reports poor appetite, Denies chills, Denies 

fever, Denies weight gain, Denies weight loss


Ears, nose, mouth and throat: Reports nasal congestion, Reports nasal discharge,

Reports post-nasal drip, Reports sore throat, Denies headache, Denies sinus 

pain, Denies sinus pressure


Cardiovascular: Denies chest pain, Denies leg edema, Denies lightheadedness, 

Denies orthopnea, Denies palpitations, Denies paroxysmal nocturnal dyspnea, 

Denies syncope


Respiratory: Reports congestion, Reports cough, Reports cough with sputum, 

Reports respiratory infections, Reports wheezing


Gastrointestinal: Reports diarrhea, Reports loss of appetite, Denies abdominal 

pain, Denies constipation, Denies nausea, Denies vomiting


Genitourinary: Reports urge incontinence, Denies dysuria, Denies flank pain, 

Denies hematuria


Musculoskeletal: Denies limitation of motion


Integumentary: Denies rash


Neurological: Denies confusion, Denies head injury, Denies headaches, Denies 

numbness, Denies paralysis, Denies paresthesias, Denies seizures, Denies 

syncope, Denies visual changes


Psychiatric: Denies anxiety, Denies depression





Past Medical History


Past Medical History: GERD/Reflux, Hypertension, Pneumonia, Rheumatoid Arthritis

(RA)


Additional Past Medical History / Comment(s): hx. of low sodium in the past, 

"goes up & down", unsure why.


History of Any Multi-Drug Resistant Organisms: None Reported


Past Surgical History: Appendectomy, Back Surgery, Hysterectomy, Joint 

Replacement, Orthopedic Surgery


Additional Past Surgical History / Comment(s): 3 X Back surgeries, ORIF left 

ankle, right shoulder surg., right hip replaced.


Past Anesthesia/Blood Transfusion Reactions: No Reported Reaction


Past Psychological History: No Psychological Hx Reported


Smoking Status: Former smoker


Past Alcohol Use History: None Reported


Additional Past Alcohol Use History / Comment(s): quit smoking 30 yrs. ago, 

smoked occasionally, not every day


Past Drug Use History: None Reported





- Past Family History


  ** Father


Family Medical History: No Reported History





Medications and Allergies


                                Home Medications











 Medication  Instructions  Recorded  Confirmed  Type


 


Celecoxib [CeleBREX] 400 mg PO DAILY 01/03/21 12/17/24 History


 


Tofacitinib Citrate [Xeljanz Xr] 11 mg PO AS DIRECTED 01/04/21 12/17/24 History


 


Metoprolol Tartrate [Lopressor] 50 mg PO BID 12/02/22 12/17/24 History


 


Fluticasone Nasal Spray [Flonase 1 spr EA NOSTRIL DAILY 12/11/22 12/17/24 

History





Nasal Spray]    


 


Ciprofloxacin HCl [Cipro] 500 mg PO Q12HR 12/17/24 12/17/24 History


 


Losartan Potassium [Cozaar] 100 mg PO DAILY 12/17/24 12/17/24 History


 


Mirtazapine [Remeron] 15 mg PO HS 12/17/24 12/17/24 History


 


busPIRone HCl [Buspar] 5 mg PO BID 12/17/24 12/17/24 History








                                    Allergies











Allergy/AdvReac Type Severity Reaction Status Date / Time


 


Tetanus Vaccines and Toxoid Allergy  Unknown Verified 12/17/24 15:10





   Childhood  


 


sulfamethoxazole AdvReac  Nausea & Verified 12/17/24 15:10





[From Bactrim]   Vomiting &  





   Diarrhea  


 


trimethoprim [From Bactrim] AdvReac  Nausea & Verified 12/17/24 15:10





   Vomiting &  





   Diarrhea  














Physical Exam


Vitals: 


                                   Vital Signs











  Temp Pulse Pulse Resp BP Pulse Ox


 


 12/19/24 01:52  98.1 F   64  18  161/69  93 L


 


 12/18/24 21:39   80    


 


 12/18/24 21:32   78    


 


 12/18/24 19:10  98.8 F   74  18  150/64  92 L


 


 12/18/24 13:35  99.1 F   64  18  166/83  93 L


 


 12/18/24 08:00     18  


 


 12/18/24 07:51  98.1 F   70  17  169/86  93 L








                                Intake and Output











 12/18/24 12/18/24 12/19/24





 14:59 22:59 06:59


 


Intake Total  600 


 


Balance  600 


 


Intake:   


 


  Oral  600 


 


Other:   


 


  Voiding Method  Bedside Commode 


 


  # Voids 1 3 1














GENERAL EXAM: Alert and fully oriented, 84-year-old female, well-nourished, 

appears euvolemic.


HEAD: Normocephalic and atraumatic


EYES: Normal reaction of pupils, equal size.


NOSE: Clear with pink turbinates.


THROAT: No erythema or exudates.


NECK: No masses, no JVD.


CHEST: No chest wall deformity.


LUNGS: Equal air entry with coarse bilateral rhonchi and expiratory wheezing.  

On room air.  No conversational dyspnea or accessory muscle use.. 


CVS: S1 and S2 normal with no audible murmur, regular rhythm. No extra heart 

sounds


ABDOMEN: No hepatosplenomegaly, active bowel sounds, no guarding or rigidity. 


SPINE: No scoliosis or deformity


SKIN: No rashes


CENTRAL NERVOUS SYSTEM: No focal deficits, tone is normal in all 4 extremities.


EXTREMITIES: There is no peripheral edema, clubbing, or cyanosis.  Peripheral 

pulses are intact.





Results





- Laboratory Findings


CBC and BMP: 


                                 12/18/24 03:31





                                 12/19/24 16:31


PT/INR, D-dimer











PT  10.2 sec (10.0-12.5)   12/17/24  14:53    


 


INR  0.9  (<1.2)   12/17/24  14:53    








Abnormal lab findings: 


                                  Abnormal Labs











  12/17/24 12/17/24 12/17/24





  14:53 14:53 14:53


 


RBC  3.79 L  


 


Hgb  11.0 L  


 


Hct  33.7 L  


 


RDW   


 


Lymphocytes #  0.5 L  


 


APTT   19.8 L 


 


Sodium    119 L*


 


Chloride    87 L


 


Glucose    107 H


 


Osmolality   


 


Phosphorus    2.3 L


 


Magnesium    1.4 L


 


AST   


 


Total Protein   


 


Albumin   


 


Urine Protein   


 


Urine Glucose (UA)   


 


Urine Ketones   


 


Urine Mucus   














  12/17/24 12/17/24 12/18/24





  14:53 20:49 03:31


 


RBC   


 


Hgb   


 


Hct   


 


RDW   


 


Lymphocytes #   


 


APTT   


 


Sodium    119 L*


 


Chloride    91 L


 


Glucose   


 


Osmolality   257 L 


 


Phosphorus    2.1 L


 


Magnesium    1.4 L


 


AST    41 H


 


Total Protein    6.2 L


 


Albumin    3.4 L


 


Urine Protein  1+ H  


 


Urine Glucose (UA)  Trace H  


 


Urine Ketones  1+ H  


 


Urine Mucus  Rare H  














  12/18/24 12/18/24 12/18/24





  03:31 07:01 12:23


 


RBC  3.65 L  


 


Hgb  10.3 L  


 


Hct  31.8 L  


 


RDW  14.7 H  


 


Lymphocytes #  0.45 L  


 


APTT   


 


Sodium   120 L  120 L


 


Chloride   


 


Glucose   


 


Osmolality   258 L 


 


Phosphorus   


 


Magnesium   


 


AST   


 


Total Protein   


 


Albumin   


 


Urine Protein   


 


Urine Glucose (UA)   


 


Urine Ketones   


 


Urine Mucus   














  12/18/24





  17:47


 


RBC 


 


Hgb 


 


Hct 


 


RDW 


 


Lymphocytes # 


 


APTT 


 


Sodium  118 L*


 


Chloride 


 


Glucose 


 


Osmolality 


 


Phosphorus 


 


Magnesium 


 


AST 


 


Total Protein 


 


Albumin 


 


Urine Protein 


 


Urine Glucose (UA) 


 


Urine Ketones 


 


Urine Mucus 














- Diagnostic Findings


Chest x-ray: image reviewed





Assessment and Plan


Assessment: 





Severe hyponatremia, appears euvolemic, suspect SIADH, possibly secondary to 

respiratory infection





Acute dyspnea, chest x-ray demonstrating bilateral coarse interstitial pattern, 

possible atypical pneumonia or viral pneumonia..  No obvious focal consolidation

or masses.  Viral screen at [facility] positive for parainfluenza virus.





Positive for parainfluenza virus.





Hypomagnesemia, replaced





History of rheumatoid arthritis, normally maintained on Xeljanz





Hypertension





Remote history of tobacco use over 50 years ago




















Plan:


Patient's medications, labs, chest x-ray reviewed


Currently on room air


Continue supportive care including antitussive, antipyretics, PRN 

bronchodilators, also was started on IV steroids


Will hold off on antibiotics at this time, check procalcitonin


Sodium 118, nephrology is managing, patient was given dose of Samsca, sodium 

recheck pending. Correction goal 6 to 8 mmol/L per 24 hours. 


Serum osmolality 258, urine osmolality 528, urine sodium 73.


TSH 0.792.


No need for ICU transfer, unless patient to be started on hypertonic saline


Will continue to follow





I have personally seen and examined the patient, performed the documentation and

the assessment and plan as written.  Number of minutes spent on the visit:20











On 12/19/2024, the patient is being seen in joint evaluation with the nurse 

practitioner.  The patient is hospitalized for a viral pneumonia and the patient

has been confirmed to have parainfluenza with some limited interstitial 

bilateral pulmonary filtrates at the same time the patient was profoundly 

hyponatremic.  Initial sodium level was 119 and the current sodium level is at 

122.  She is doing well.  No altered mentation.  No seizure activity.  She is on

Ventolin nebulized treatments as needed and 4 times daily in addition to IV 

Solu-Medrol 40 mg every 12 hours.  She has a component of COPD which is 

currently exacerbated.  She has previous history of smoking and the patient is a

50-pack-year smoking history.  She also has history of rheumatoid arthritis 

maintained on Xeljanz which will be placed on hold for now.  She has 

hypertension.  No diuretic intake.  Nephrology is on the case regarding the 

ongoing hyponatremia.  The patient is subjected to fluid restriction.  She 

received Samsca last night.  She is producing adequate amount of urine output.  

Her hyponatremia is euvolemic.


Time with Patient: Greater than 30

## 2024-12-19 NOTE — P.PN
Progress Note - Text


Progress Note Date: 12/19/24





Chief Complaint: Short of breath





Pleasant 84-year-old patient follows with Dr. Strange.  Chronic stable medical 

conditions include interstitial lung disease/hiatal hernia/essential 

hypertension/rheumatoid arthritis.


Patient will present 1 week of increasing cough.  Some clear sputum.  Decreased 

appetite.  Tired rundown.  Not able to sleep well.  Patient is transferred from 

[facility].  Patient has low sodium and also tested positive for 

influenza at the transferring hospital.  Patient also complains of joint pains. 

Normally at home she just takes Tylenol.  Some flareup with her getting sick.  

Patient not been eating much.  Though she drinks quite a bit of water.  

Patient's son and daughter at the bedside.  Just completed a course of 

antibiotic for UTI.





December 19: A bit tired.  Did sit up in a chair.  Eating some.  Remains on 

fluid restriction.  Sodium only slowly coming up 122.  Being followed by 

nephrology.Cut back Solu-Medrol.  Patient given 1 dose of Samsca by nephrology 

earlier





Active Medications





Albuterol Sulfate (Albuterol Nebulized 2.5 Mg/3 Ml)  2.5 mg INHALATION RT-QID 

[facility]


   Last Admin: 12/19/24 16:16 Dose:  2.5 mg


   


Albuterol Sulfate (Albuterol Nebulized 2.5 Mg/3 Ml)  2.5 mg INHALATION RT-QID 

PRN


   PRN Reason: Shortness Of Breath Or Wheezing


Buspirone HCl (Buspirone Hcl 5 Mg Tab)  5 mg PO BID [facility]


   Last Admin: 12/19/24 08:07 Dose:  5 mg


   


Calcium Carbonate/Glycine (Calcium Carbonate 500 Mg Chewable)  1,000 mg PO TID 

[facility]


   Last Admin: 12/19/24 14:51 Dose:  1,000 mg


   


Enoxaparin Sodium (Enoxaparin 40 Mg/0.4 Ml Syringe)  40 mg SQ DAILY [facility]


   Last Admin: 12/19/24 08:07 Dose:  40 mg


   


Famotidine (Famotidine 20 Mg Tab)  20 mg PO BID [facility]


   Last Admin: 12/19/24 14:51 Dose:  20 mg


   


Guaifenesin/Dextromethorphan (Guaifenesin-Dm 600/30mg 1 Each Tab.Er.12h)  2 each

PO Q12HR PRN


   PRN Reason: Cough


   Last Admin: 12/18/24 21:43 Dose:  2 each


   


Losartan Potassium (Losartan 50 Mg Tab)  100 mg PO DAILY [facility]


   Last Admin: 12/19/24 08:07 Dose:  100 mg


   


Magnesium Oxide (Magnesium Oxide 400 Mg Tab)  400 mg PO DAILY [facility]


   Last Admin: 12/19/24 11:33 Dose:  400 mg


   


Methylprednisolone Sodium Succinate (Methylprednisolone Sod Succi 40 Mg/Ml 1 Ml 

Vial)  40 mg IV Q8HR [facility]


   Last Admin: 12/19/24 16:00 Dose:  40 mg


   


Metoprolol Tartrate (Metoprolol Tartrate 50 Mg Tab)  50 mg PO BID [facility]


   Last Admin: 12/19/24 08:07 Dose:  50 mg


   


Mirtazapine (Mirtazapine 15 Mg Tab)  15 mg PO HS [facility]


   Last Admin: 12/18/24 21:43 Dose:  15 mg


   


Miscellaneous Information (Phosphorus Replacement Protoco 1 Each Misc)  1 each 

MISCELLANE DAILY PRN; Protocol


   PRN Reason: Per Protocol


Naloxone HCl (Naloxone 0.4 Mg/Ml 1 Ml Vial)  0.2 mg IV Q2M PRN


   PRN Reason: Opioid Reversal


Ondansetron HCl (Ondansetron 4 Mg/2 Ml Vial)  4 mg IVP Q8HR PRN


   PRN Reason: Nausea And Vomiting


Tamsulosin HCl (Tamsulosin 0.4 Mg Cap.Er.24h)  0.4 mg PO PC-BRKFST [facility]


   Last Admin: 12/19/24 08:07 Dose:  0.4 mg


   


Tramadol HCl (Tramadol 50 Mg Tab)  50 mg PO TID PRN


   PRN Reason: Pain


   Last Admin: 12/18/24 23:54 Dose:  50 mg


   




















Past medical history to include:


GERD, hypertension, rheumatoid arthritis, pulmonary fibrosis osteoarthritis





Social history:


Lives alone.  Stop smoking, over 30 years ago.  Smoked occasionally.  No 

alcohol.











Physical examination:


VITAL SIGNS: 97.9, 65, 18, 94% room air


GENERAL: Reclining in bed, less short of breath.


EYES: Pupils equal.  Conjunctiva normal.


HEENT: External appearance of nose and ears normal, oral cavity grossly normal.


NECK: JVD not raised; masses not palpable.


HEART: First and second heart sounds are normal;  no edema.  


LUNGS: Respiratory rate increased, fine crackles, decreased wheezing


ABDOMEN: Soft,  nontender, liver spleen not palpable, no masses palpable.  


PSYCH: Alert and oriented x3;  mood  and affect anxious.  


MUSCULOSKELETAL:No Clubbing/cyanosis;muscles-grossly intact.  Evidence of RA and

OA.








INVESTIGATIONS, reviewed in the clinical context:


December 19: Sodium 119, repeat 122


December 18: White count 5.9 hemoglobin 10.3 platelets 240 sodium 119 potassium 

3.9 creatinine 0.52 serum osmolality 257 phosphorus 2.1 magnesium 1.4 TSH 0.79


December 17: Sodium 119 potassium 4.2 creatinine 0.5


EKG tracing personally reviewed by me-normal sinus rhythm





Assessment and plan:





-Acute influenza pneumonitis, causing hypoxia, pulse ox 93%.


Repeat influenza PCR here nondetected





-Element of COPD exacerbation in a prior smoker with underlying pulmonary 

fibrosis


IV Solu-Medrol.  Bronchodilator


Incentive spirometry








-Chronic pulmonary fibrosis, from underlying rheumatoid arthritis





-Chronic rheumatoid arthritis, acute flareup from underlying infection decreased

mobility


xeljanz-hold for now, Celebrex


 Ultram 50 mg every 8 as needed








-Primary osteoarthritis


Celebrex








-Severe hyponatremia, likely hypovolemia and hypoosmolar will decrease food 

intake and increase water intake: Slow to respond


Fluid restriction.  IV fluids


1 dose of Samsca given earlier





-Essential hypertension


Losartan, Lopressor





-Chronic gait dysfunction, uses a walker at baseline


Fall precautions





-DNR





-Patient's daughter Rivka, medical POA





Continue current medication treatment plan including fluid restriction.  Follow-

up with nephrology pulmonary





Past Medical History


Past Medical History: GERD/Reflux, Hypertension, Pneumonia, Rheumatoid Arthritis

(RA)


Additional Past Medical History / Comment(s): hx. of low sodium in the past, 

"goes up & down", unsure why.


History of Any Multi-Drug Resistant Organisms: None Reported


Past Surgical History: Appendectomy, Back Surgery, Hysterectomy, Joint 

Replacement, Orthopedic Surgery


Additional Past Surgical History / Comment(s): 3 X Back surgeries, ORIF left 

ankle, right shoulder surg., right hip replaced.


Past Anesthesia/Blood Transfusion Reactions: No Reported Reaction


Past Psychological History: No Psychological Hx Reported


Smoking Status: Former smoker


Past Alcohol Use History: None Reported


Additional Past Alcohol Use History / Comment(s): quit smoking 30 yrs. ago, 

smoked occasionally, not every day


Past Drug Use History: None Reported

## 2024-12-20 LAB
ANION GAP SERPL CALC-SCNC: 9.7 MMOL/L (ref 4–12)
BUN SERPL-SCNC: 54.3 MG/DL (ref 9–27)
BUN/CREAT SERPL: 67.88 RATIO (ref 12–20)
CALCIUM SPEC-MCNC: 9.9 MG/DL (ref 8.7–10.3)
CHLORIDE SERPL-SCNC: 90 MMOL/L (ref 96–109)
CO2 SERPL-SCNC: 26.3 MMOL/L (ref 21.6–31.8)
GLUCOSE SERPL-MCNC: 161 MG/DL (ref 70–110)
MAGNESIUM SPEC-SCNC: 2 MG/DL (ref 1.5–2.4)
POTASSIUM SERPL-SCNC: 4.2 MMOL/L (ref 3.5–5.5)
SODIUM SERPL-SCNC: 126 MMOL/L (ref 135–145)

## 2024-12-20 RX ADMIN — TOLVAPTAN ONE MG: 15 TABLET ORAL at 14:18

## 2024-12-20 NOTE — P.PN
Subjective


Progress Note Date: 12/20/24











Patient is 84-year-old female with past medical history significant for 

hypertension, rheumatoid arthritis, and remote history of tobacco use over 50 

years ago.  Patient was transferred from [facility] for hyponatremia.  

Over the last  2 weeks patient has had URI-like symptoms including nasal 

congestion, sore throat, and cough.  Cough has become more congested and coarse.

Mostly nonproductive. Denies any fevers, significant sputum production, chest 

pain, hemoptysis.  Denies nausea or vomiting.  Some small amounts of self-

limiting loose stools.  Appetite has been poor.  Viral screen at [facility] positive for parainfluenza virus.  Also, her sodium level was noted to 

be low.  She was transferred to [facility] for nephrology 

consultation.  Sodium noted to be 119 on arrival to our facility.  Patient was 

admitted to the general medical floor.  Has not been started on any hypertonic 

saline.  She is alert and oriented.  No seizure activity.  She states that her 

sodium has been low in the past, and was previously was on sodium tabs.  She 

states that she has been drinking about 3 bottles of water per day due to her 

recent illness.  She appears euvolemic.  No SSRIs. Does take mirtazapine and 

NSAIDs.  Denies diuretic use.  She has a coarse congested cough.  Nonproductive.

 She is on room air.  Denies any pre-existing lung disease.  Not in any 

respiratory distress..Chest x-ray showing bilateral interstitial densities 

consistent with possible atypical pneumonia.  No obvious focal densities or 

masses.  CBC: WBC count 5.9, hemoglobin 10.3, hematocrit 31.8, platelets 240.  

CMP: Sodium 118, potassium 3.9, chloride 91, serum bicarb 25, BUN 11, creatinine

0.52, glucose 93.  LFTs unremarkable.  Magnesium 1.4.  TSH 0.792.  Serum 

osmolality 258.  Urine sodium 73.  Urine osmolality pending.  Nephrology is 

managing.  Current vitals: Temperature 98.1 F, heart rate 64 bpm, blood 

pressure 161/69 mmHg, nontachypneic, SpO2 93% on room air.








On today's evaluation of 12/20/2024, the patient is being seen for a follow-up. 

The patient is slightly improved compared to yesterday.  Less bronchospastic and

wheezy.  The patient has a parainfluenza pneumonia but COPD exacerbation.  The 

patient is on IV Solu-Medrol 40 mg every 12 hours.  She is also on Ventolin 

nebulized treatments around-the-clock.  Her sodium level continues to improve 

and his sodium level is currently up to 126.  Electrolytes are all stable.  

Chloride is 90.  BUN is 54 with a creatinine of 0.8.  The viral screen including

influenza RSV and COVID-19 were negative.  The patient is currently on oxygen at

room air with a pulse ox of 93%.  No altered mentation.  Family is at the 

bedside.





Objective





- Vital Signs


Vital signs: 


                                   Vital Signs











Temp  97.8 F   12/20/24 07:00


 


Pulse  76   12/20/24 12:15


 


Resp  16   12/20/24 08:00


 


BP  130/62   12/20/24 07:00


 


Pulse Ox  93 L  12/20/24 07:00


 


FiO2      








                                 Intake & Output











 12/19/24 12/20/24 12/20/24





 18:59 06:59 18:59


 


Intake Total 860  


 


Output Total  100 


 


Balance 860 -100 


 


Intake:   


 


  Oral 860  


 


Output:   


 


  Urine  100 


 


Other:   


 


  Voiding Method Bedside Commode Bedside Commode Bedside Commode


 


  # Voids 2 2 














- Exam








GENERAL EXAM: Alert and fully oriented, 84-year-old female, well-nourished, 

appears euvolemic.


HEAD: Normocephalic and atraumatic


EYES: Normal reaction of pupils, equal size.


NOSE: Clear with pink turbinates.


THROAT: No erythema or exudates.


NECK: No masses, no JVD.


CHEST: No chest wall deformity.


LUNGS: Equal air entry with coarse bilateral rhonchi and expiratory wheezing.  

On room air.  No conversational dyspnea or accessory muscle use.. 


CVS: S1 and S2 normal with no audible murmur, regular rhythm. No extra heart 

sounds


ABDOMEN: No hepatosplenomegaly, active bowel sounds, no guarding or rigidity. 


SPINE: No scoliosis or deformity


SKIN: No rashes


CENTRAL NERVOUS SYSTEM: No focal deficits, tone is normal in all 4 extremities.


EXTREMITIES: There is no peripheral edema, clubbing, or cyanosis.  Peripheral 

pulses are intact.








- Labs


CBC & Chem 7: 


                                 12/18/24 03:31





                                 12/20/24 03:26


Labs: 


                  Abnormal Lab Results - Last 24 Hours (Table)











  12/19/24 12/20/24 Range/Units





  16:31 03:26 


 


Sodium  122 L  126 L  (137-145)  mmol/L


 


Chloride   90 L  ()  mmol/L


 


BUN   54.3 H  (9.0-27.0)  mg/dL


 


BUN/Creatinine Ratio   67.88 H  (12.00-20.00)  Ratio


 


Glucose   161 H  ()  mg/dL














Assessment and Plan


Assessment: 





Severe hyponatremia, appears euvolemic, suspect SIADH, possibly secondary to 

respiratory infection, improving and the sodium level is up to 126





Acute COPD exacerbation, likely secondary to viral pneumonia, patient was found 

to have a parainfluenza infection and her viral screen that was done in [facility].


 


Shortness of breath secondary to above





Hypomagnesemia, replaced





History of rheumatoid arthritis, normally maintained on Xeljanz





Hypertension





Remote history of tobacco use over 50 years ago








Plan:





Currently on room air


Continue supportive care including antitussive, antipyretics, 


Continue bronchodilators


Continue IV Solu-Medrol


Sodium level is improving


Procalcitonin level is not elevated


We will continue to follow.

## 2024-12-20 NOTE — P.PN
Progress Note - Text


Progress Note Date: 12/20/24





Chief Complaint: Tired





Pleasant 84-year-old patient follows with Dr. Strange.  Chronic stable medical 

conditions include interstitial lung disease/hiatal hernia/essential 

hypertension/rheumatoid arthritis.


Patient will present 1 week of increasing cough.  Some clear sputum.  Decreased 

appetite.  Tired rundown.  Not able to sleep well.  Patient is transferred from 

[facility].  Patient has low sodium and also tested positive for 

influenza at the transferring hospital.  Patient also complains of joint pains. 

Normally at home she just takes Tylenol.  Some flareup with her getting sick.  

Patient not been eating much.  Though she drinks quite a bit of water.  Pat

jay's son and daughter at the bedside.  Just completed a course of antibiotic 

for UTI.





December 19: A bit tired.  Did sit up in a chair.  Eating some.  Remains on 

fluid restriction.  Sodium only slowly coming up 122.  Being followed by 

nephrology.Cut back Solu-Medrol.  Patient given 1 dose of Samsca by nephrology 

earlier





December 20: Encourage oral intake.  Sodium up to 126.  Samsca and urea given 

today by nephrology.  Water intake discussed with the patient's son at the 

bedside.  Increase activity.


Repeat panel for influenza negative.





Active Medications





Albuterol Sulfate (Albuterol Nebulized 2.5 Mg/3 Ml)  2.5 mg INHALATION RT-QID 

[facility]


   Last Admin: 12/20/24 15:56 Dose:  Not Given


   


Albuterol Sulfate (Albuterol Nebulized 2.5 Mg/3 Ml)  2.5 mg INHALATION RT-QID 

PRN


   PRN Reason: Shortness Of Breath Or Wheezing


Buspirone HCl (Buspirone Hcl 5 Mg Tab)  5 mg PO BID [facility]


   Last Admin: 12/20/24 09:29 Dose:  5 mg


   


Calcium Carbonate/Glycine (Calcium Carbonate 500 Mg Chewable)  1,000 mg PO TID 

[facility]


   Last Admin: 12/20/24 09:28 Dose:  1,000 mg


   


Enoxaparin Sodium (Enoxaparin 40 Mg/0.4 Ml Syringe)  40 mg SQ DAILY [facility]


   Last Admin: 12/20/24 09:29 Dose:  40 mg


   


Famotidine (Famotidine 20 Mg Tab)  20 mg PO BID [facility]


   Last Admin: 12/20/24 09:29 Dose:  20 mg


   


Guaifenesin/Dextromethorphan (Guaifenesin-Dm 600/30mg 1 Each Tab.Er.12h)  2 each

PO Q12HR PRN


   PRN Reason: Cough


   Last Admin: 12/18/24 21:43 Dose:  2 each


   


Losartan Potassium (Losartan 50 Mg Tab)  100 mg PO DAILY [facility]


   Last Admin: 12/20/24 09:28 Dose:  100 mg


   


Magnesium Oxide (Magnesium Oxide 400 Mg Tab)  400 mg PO DAILY [facility]


   Last Admin: 12/20/24 09:29 Dose:  400 mg


   


Methylprednisolone Sodium Succinate (Methylprednisolone Sod Succi 40 Mg/Ml 1 Ml 

Vial)  40 mg IV Q12H [facility]


   Last Admin: 12/20/24 06:53 Dose:  40 mg


   


Metoprolol Tartrate (Metoprolol Tartrate 50 Mg Tab)  50 mg PO BID [facility]


   Last Admin: 12/20/24 09:28 Dose:  50 mg


   


Mirtazapine (Mirtazapine 15 Mg Tab)  15 mg PO HS [facility]


   Last Admin: 12/19/24 20:04 Dose:  15 mg


   


Miscellaneous Information (Phosphorus Replacement Protoco 1 Each Misc)  1 each 

MISCELLANE DAILY PRN; Protocol


   PRN Reason: Per Protocol


Naloxone HCl (Naloxone 0.4 Mg/Ml 1 Ml Vial)  0.2 mg IV Q2M PRN


   PRN Reason: Opioid Reversal


Ondansetron HCl (Ondansetron 4 Mg/2 Ml Vial)  4 mg IVP Q8HR PRN


   PRN Reason: Nausea And Vomiting


Tamsulosin HCl (Tamsulosin 0.4 Mg Cap.Er.24h)  0.4 mg PO PC-BRKFST [facility]


   Last Admin: 12/20/24 09:28 Dose:  0.4 mg


   


Tramadol HCl (Tramadol 50 Mg Tab)  50 mg PO TID PRN


   PRN Reason: Pain


   Last Admin: 12/18/24 23:54 Dose:  50 mg


   


Urea (Urea 15 Gm Powd.Pack)  15 gm PO DAILY [facility]


   Last Admin: 12/20/24 14:18 Dose:  15 gm


   














Past medical history to include:


GERD, hypertension, rheumatoid arthritis, pulmonary fibrosis osteoarthritis





Social history:


Lives alone.  Stop smoking, over 30 years ago.  Smoked occasionally.  No 

alcohol.











Physical examination:


VITAL SIGNS: 97.7, 71, 17, 147/73, 94%


GENERAL: Reclining in bed, l breathing much better


EYES: Pupils equal.  Conjunctiva normal.


HEENT: External appearance of nose and ears normal, oral cavity grossly normal.


NECK: JVD not raised; masses not palpable.


HEART: First and second heart sounds are normal;  no edema.  


LUNGS: Respiratory rate increased, fine crackles, 


ABDOMEN: Soft,  nontender, liver spleen not palpable, no masses palpable.  


PSYCH: Alert and oriented x3;  mood  and affect anxious.  


MUSCULOSKELETAL:No Clubbing/cyanosis;muscles-grossly intact.  Evidence of RA and

OA.








INVESTIGATIONS, reviewed in the clinical context:


December 20: Sodium 126 creatinine 0.8


Influenza type A, type B, RSV, COVID-19: Not detected


December 19: Sodium 119, repeat 122


December 18: White count 5.9 hemoglobin 10.3 platelets 240 sodium 119 potassium 

3.9 creatinine 0.52 serum osmolality 257 phosphorus 2.1 magnesium 1.4 TSH 0.79


December 17: Sodium 119 potassium 4.2 creatinine 0.5


EKG tracing personally reviewed by me-normal sinus rhythm





Assessment and plan:





-Severe hyponatremia, likely hypovolemia and hypoosmolar will decrease food 

intake and increase water intake: Slow to respond


Fluid restriction.  IV fluids


Patient given Samsca urea.  When nephrology





-Acute influenza pneumonitis, causing hypoxia, pulse ox 93%.:  Improved


Repeat influenza PCR here nondetected





-Element of COPD exacerbation in a prior smoker with underlying pulmonary 

fibrosis


IV Solu-Medrol.  Bronchodilator


Incentive spirometry








-Chronic pulmonary fibrosis, from underlying rheumatoid arthritis


Pulmonary Dr. Mccain consulted





-Chronic rheumatoid arthritis, acute flareup from underlying infection decreased

mobility


xeljanz-hold for now, Celebrex


 Ultram 50 mg every 8 as needed








-Primary osteoarthritis


Celebrex





-Essential hypertension


Losartan, Lopressor





-Chronic gait dysfunction, uses a walker at baseline


Fall precautions





-DNR





-Patient's daughter Rivka, medical POA





Discussed with patient.  Increase activity.  Follow labs.  Dr. Mccain was 

consulted





Past Medical History


Past Medical History: GERD/Reflux, Hypertension, Pneumonia, Rheumatoid Arthritis

(RA)


Additional Past Medical History / Comment(s): hx. of low sodium in the past, 

"goes up & down", unsure why.


History of Any Multi-Drug Resistant Organisms: None Reported


Past Surgical History: Appendectomy, Back Surgery, Hysterectomy, Joint 

Replacement, Orthopedic Surgery


Additional Past Surgical History / Comment(s): 3 X Back surgeries, ORIF left 

ankle, right shoulder surg., right hip replaced.


Past Anesthesia/Blood Transfusion Reactions: No Reported Reaction


Past Psychological History: No Psychological Hx Reported


Smoking Status: Former smoker


Past Alcohol Use History: None Reported


Additional Past Alcohol Use History / Comment(s): quit smoking 30 yrs. ago, 

smoked occasionally, not every day


Past Drug Use History: None Reported

## 2024-12-20 NOTE — P.PN
Subjective





Patient is seen in follow-up for hyponatremia.  Received Samsca yesterday.  

Sodium level better.  Oral intake is poor.  Has been voiding.





Vital signs are stable.


General: No acute distress.


HEENT: Head exam is unremarkable. 


LUNGS: No audible rhonchi or wheezes.


HEART: Rate and Rhythm are regular. 


ABDOMEN: Nontender.


EXTREMITITES: No edema.





Objective





- Vital Signs


Vital signs: 


                                   Vital Signs











Temp  97.8 F   12/20/24 07:00


 


Pulse  75   12/20/24 09:21


 


Resp  16   12/20/24 08:00


 


BP  130/62   12/20/24 07:00


 


Pulse Ox  93 L  12/20/24 07:00


 


FiO2      








                                 Intake & Output











 12/19/24 12/20/24 12/20/24





 18:59 06:59 18:59


 


Intake Total 860  


 


Output Total  100 


 


Balance 860 -100 


 


Intake:   


 


  Oral 860  


 


Output:   


 


  Urine  100 


 


Other:   


 


  Voiding Method Bedside Commode Bedside Commode Bedside Commode


 


  # Voids 2 2 














- Labs


CBC & Chem 7: 


                                 12/18/24 03:31





                                 12/20/24 03:26


Labs: 


                  Abnormal Lab Results - Last 24 Hours (Table)











  12/19/24 12/20/24 Range/Units





  16:31 03:26 


 


Sodium  122 L  126 L  (137-145)  mmol/L


 


Chloride   90 L  ()  mmol/L


 


BUN   54.3 H  (9.0-27.0)  mg/dL


 


BUN/Creatinine Ratio   67.88 H  (12.00-20.00)  Ratio


 


Glucose   161 H  ()  mg/dL














Assessment and Plan


Plan: 





Assessment:


1.  Hyponatremia.  Appears euvolemic.  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

poor solute intake and NSAID use (received mobic this admission).  No 

improvement in sodium level post IV fluids.  Status post Lasix and Samsca 

yesterday.  Sodium level 126 this morning.  TSH normal.  Serum osmolality 258.  

Urine sodium 73 and urine osmolality 528.


2.  Benign hypertension.  Exacerbated by pain and steroids.


3.  Hypomagnesemia from poor intake.  On oral magnesium oxide.  Better.


4.  Hypophosphatemia from poor intake.


5.  Urinary retention.  Required straight catheterization once.  On Flomax.


6.  Influenza A infection.





Plan:


Repeat urea and Samsca today.


Maintain 1500 cc fluid restriction.


Encouraged oral intake.


Monitor bladder scans closely.  Insert Wood catheter if persistently retaining 

over 300 cc urine.


Avoid NSAIDs.

## 2024-12-21 LAB
ANION GAP SERPL CALC-SCNC: 9.9 MMOL/L (ref 4–12)
BUN SERPL-SCNC: 63.8 MG/DL (ref 9–27)
BUN/CREAT SERPL: 79.75 RATIO (ref 12–20)
CALCIUM SPEC-MCNC: 10.3 MG/DL (ref 8.7–10.3)
CHLORIDE SERPL-SCNC: 94 MMOL/L (ref 96–109)
CO2 SERPL-SCNC: 28.1 MMOL/L (ref 21.6–31.8)
GLUCOSE SERPL-MCNC: 140 MG/DL (ref 70–110)
MAGNESIUM SPEC-SCNC: 2.2 MG/DL (ref 1.5–2.4)
POTASSIUM SERPL-SCNC: 4.8 MMOL/L (ref 3.5–5.5)
SODIUM SERPL-SCNC: 132 MMOL/L (ref 135–145)

## 2024-12-21 RX ADMIN — POTASSIUM CHLORIDE ONE: 14.9 INJECTION, SOLUTION INTRAVENOUS at 18:05

## 2024-12-21 NOTE — P.PN
Subjective


Progress Note Date: 12/21/24


Pleasant 84-year-old patient follows with Dr. Strange.  Chronic stable medical 

conditions include interstitial lung disease/hiatal hernia/essential 

hypertension/rheumatoid arthritis.


Patient will present 1 week of increasing cough.  Some clear sputum.  Decreased 

appetite.  Tired rundown.  Not able to sleep well.  Patient is transferred from 

[facility].  Patient has low sodium and also tested positive for 

influenza at the transferring hospital.  Patient also complains of joint pains. 

Normally at home she just takes Tylenol.  Some flareup with her getting sick.  

Patient not been eating much.  Though she drinks quite a bit of water.  

Patient's son and daughter at the bedside.  Just completed a course of 

antibiotic for UTI.





December 19: A bit tired.  Did sit up in a chair.  Eating some.  Remains on 

fluid restriction.  Sodium only slowly coming up 122.  Being followed by 

nephrology.Cut back Solu-Medrol.  Patient given 1 dose of Samsca by nephrology 

earlier





December 20: Encourage oral intake.  Sodium up to 126.  Samsca and urea given 

today by nephrology.  Water intake discussed with the patient's son at the 

bedside.  Increase activity.


Repeat panel for influenza negative.





12/21.  Patient seen and examined.  States she feels better.  Still being 

lethargic and weakness





REVIEW OF SYSTEMS: 


CONSTITUTIONAL: No fever, no malaise,. 


CARDIOVASCULAR: No chest pain, no palpitations, no syncope. 


PULMONARY: No shortness of breath, no cough, 


GASTROINTESTINAL: No diarrhea, no nausea, no vomiting, no abdominal pain. 


NEUROLOGICAL: No headaches, no weakness, 





PHYSICAL EXAMINATION: 





GENERAL: The patient is alert and oriented x3, ill looking


HEENT: Pupils are round and equally reacting to light. EOMI. No scleral icterus.

No conjunctival pallor. Normocephalic, atraumatic. No pharyngeal erythema. No 

thyromegaly. 


CARDIOVASCULAR: S1 and S2 present. No murmurs, rubs, or gallops. 


PULMONARY: Chest is clear to auscultation, no wheezing or crackles. 


ABDOMEN: Soft, nontender, nondistended, normoactive bowel sounds. No palpable 

organomegaly. 


MUSCULOSKELETAL: No joint swelling or deformity.


EXTREMITIES: No cyanosis, clubbing, or pedal edema. 


NEUROLOGICAL: Gross neurological examination did not reveal any focal deficits. 


SKIN: No rashes. 





Assessment and plan


-Severe hyponatremia, likely hypovolemia and hypoosmolar 


Monitor vital signs monitor CBC


Monitor CMP


Fluid restriction. 


 Nephrology following





-Acute influenza pneumonitis, causing hypoxia, pulse ox 93%.:  Improved








-Element of COPD exacerbation in a prior smoker with underlying pulmonary 

fibrosis


-Chronic pulmonary fibrosis, from underlying rheumatoid arthritis


Continue breathing treatments: Continue IV Solu-Medrol, pulmonology following





-Chronic rheumatoid arthritis, acute flareup from underlying infection decreased

mobility


xeljanz-hold for now, Celebrex


 Ultram 50 mg every 8 as needed








-Primary osteoarthritis


Celebrex





-Essential hypertension


Losartan, Lopressor





-Chronic gait dysfunction, uses a walker at baseline


Fall precautions





Labs and medication were reviewed..  Continue same treatment.  Continue with 

symptomatic treatment.  Resume home medication.  Monitor labs and vitals.  DVT 

and GI prophylaxis.  Further recommendations as per clinical course of the 

patient








Dictation was produced using dragon dictation software. please excuse any 

grammatical, word or spelling errors. 











Objective





- Vital Signs


Vital signs: 


                                   Vital Signs











Temp  97.9 F   12/21/24 07:10


 


Pulse  75   12/21/24 07:10


 


Resp  17   12/21/24 08:00


 


BP  181/77   12/21/24 07:10


 


Pulse Ox  95   12/21/24 07:10


 


FiO2      








                                 Intake & Output











 12/20/24 12/21/24 12/21/24





 18:59 06:59 18:59


 


Output Total 600  


 


Balance -600  


 


Output:   


 


  Urine 600  


 


Other:   


 


  Voiding Method Bedside Commode Bedside Commode 


 


  # Voids 2 3 


 


  # Bowel Movements 2  














- Labs


CBC & Chem 7: 


                                 12/18/24 03:31





                                 12/21/24 03:45

## 2024-12-21 NOTE — P.PN
Subjective





Patient is seen in follow-up for hyponatremia.  Sodium level 126 yesterday.  

Oral intake is fair.  Has been voiding.





Vital signs are stable.


General: No acute distress.


HEENT: Head exam is unremarkable. 


LUNGS: No audible rhonchi or wheezes.


HEART: Rate and Rhythm are regular. 


ABDOMEN: Nontender.


EXTREMITITES: No edema.





Objective





- Vital Signs


Vital signs: 


                                   Vital Signs











Temp  97.9 F   12/21/24 07:10


 


Pulse  75   12/21/24 07:10


 


Resp  17   12/21/24 08:00


 


BP  181/77   12/21/24 07:10


 


Pulse Ox  95   12/21/24 07:10


 


FiO2      








                                 Intake & Output











 12/20/24 12/21/24 12/21/24





 18:59 06:59 18:59


 


Output Total 600  


 


Balance -600  


 


Output:   


 


  Urine 600  


 


Other:   


 


  Voiding Method Bedside Commode Bedside Commode 


 


  # Voids 2 3 


 


  # Bowel Movements 2  














- Labs


CBC & Chem 7: 


                                 12/18/24 03:31





                                 12/20/24 03:26





Assessment and Plan


Plan: 





Assessment:


1.  Hyponatremia.  Appears euvolemic.  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

poor solute intake and NSAID use (received mobic this admission).  No 

improvement in sodium level post IV fluids.  Status post Lasix and Samsca this 

admission.  Sodium level 126 yesterday.  TSH normal.  Serum osmolality 258.  

Urine sodium 73 and urine osmolality 528.


2.  Benign hypertension.  Exacerbated by pain and steroids.


3.  Hypomagnesemia from poor intake.  On oral magnesium oxide.  Better.


4.  Hypophosphatemia from poor intake.  Expect improvement with better oral 

intake.


5.  Urinary retention.  Required straight catheterization once.  On Flomax.


6.  Influenza A infection.





Plan:


Maintain urea.  Status post Samsca yesterday.


Maintain 1500 cc fluid restriction.


Encouraged oral intake.


Avoid NSAIDs.


Morning labs pending.


Replace electrolytes as needed.

## 2024-12-21 NOTE — P.PN
Subjective


Progress Note Date: 12/21/24











Patient is 84-year-old female with past medical history significant for 

hypertension, rheumatoid arthritis, and remote history of tobacco use over 50 

years ago.  Patient was transferred from [facility] for hyponatremia.  

Over the last  2 weeks patient has had URI-like symptoms including nasal 

congestion, sore throat, and cough.  Cough has become more congested and coarse.

Mostly nonproductive. Denies any fevers, significant sputum production, chest 

pain, hemoptysis.  Denies nausea or vomiting.  Some small amounts of self-

limiting loose stools.  Appetite has been poor.  Viral screen at [facility] positive for parainfluenza virus.  Also, her sodium level was noted to 

be low.  She was transferred to [facility] for nephrology 

consultation.  Sodium noted to be 119 on arrival to our facility.  Patient was 

admitted to the general medical floor.  Has not been started on any hypertonic 

saline.  She is alert and oriented.  No seizure activity.  She states that her 

sodium has been low in the past, and was previously was on sodium tabs.  She 

states that she has been drinking about 3 bottles of water per day due to her 

recent illness.  She appears euvolemic.  No SSRIs. Does take mirtazapine and 

NSAIDs.  Denies diuretic use.  She has a coarse congested cough.  Nonproductive.

 She is on room air.  Denies any pre-existing lung disease.  Not in any 

respiratory distress..Chest x-ray showing bilateral interstitial densities 

consistent with possible atypical pneumonia.  No obvious focal densities or 

masses.  CBC: WBC count 5.9, hemoglobin 10.3, hematocrit 31.8, platelets 240.  

CMP: Sodium 118, potassium 3.9, chloride 91, serum bicarb 25, BUN 11, creatinine

0.52, glucose 93.  LFTs unremarkable.  Magnesium 1.4.  TSH 0.792.  Serum 

osmolality 258.  Urine sodium 73.  Urine osmolality pending.  Nephrology is 

managing.  Current vitals: Temperature 98.1 F, heart rate 64 bpm, blood 

pressure 161/69 mmHg, nontachypneic, SpO2 93% on room air.








On today's evaluation of 12/20/2024, the patient is being seen for a follow-up. 

The patient is slightly improved compared to yesterday.  Less bronchospastic and

wheezy.  The patient has a parainfluenza pneumonia but COPD exacerbation.  The 

patient is on IV Solu-Medrol 40 mg every 12 hours.  She is also on Ventolin 

nebulized treatments around-the-clock.  Her sodium level continues to improve 

and his sodium level is currently up to 126.  Electrolytes are all stable.  

Chloride is 90.  BUN is 54 with a creatinine of 0.8.  The viral screen including

influenza RSV and COVID-19 were negative.  The patient is currently on oxygen at

room air with a pulse ox of 93%.  No altered mentation.  Family is at the 

bedside.





On 12/21/2024, the patient is being seen for a follow-up.  Doing well.  Less 

bronchospastic and wheezy compared to yesterday.  Remains on steroids.  No new 

complaints.  She is on room air oxygen.  Follow-up sodium levels at 132, BUN 63 

with a creatinine of 0.8 and a potassium level is at 4.8.  She remains on 

Ventolin 4 times daily around-the-clock nebulized treatments and she remains on 

IV Solu-Medrol 40 mg every 12 hours.  Feeling better.





Objective





- Vital Signs


Vital signs: 


                                   Vital Signs











Temp  97.9 F   12/21/24 07:10


 


Pulse  75   12/21/24 07:10


 


Resp  17   12/21/24 08:00


 


BP  181/77   12/21/24 07:10


 


Pulse Ox  95   12/21/24 07:10


 


FiO2      








                                 Intake & Output











 12/20/24 12/21/24 12/21/24





 18:59 06:59 18:59


 


Output Total 600  


 


Balance -600  


 


Output:   


 


  Urine 600  


 


Other:   


 


  Voiding Method Bedside Commode Bedside Commode 


 


  # Voids 2 3 


 


  # Bowel Movements 2  














- Exam








GENERAL EXAM: Alert and fully oriented, 84-year-old female, well-nourished, 

appears euvolemic.


HEAD: Normocephalic and atraumatic


EYES: Normal reaction of pupils, equal size.


NOSE: Clear with pink turbinates.


THROAT: No erythema or exudates.


NECK: No masses, no JVD.


CHEST: No chest wall deformity.


LUNGS: Equal air entry with coarse bilateral rhonchi and expiratory wheezing.  

On room air.  No conversational dyspnea or accessory muscle use.. 


CVS: S1 and S2 normal with no audible murmur, regular rhythm. No extra heart 

sounds


ABDOMEN: No hepatosplenomegaly, active bowel sounds, no guarding or rigidity. 


SPINE: No scoliosis or deformity


SKIN: No rashes


CENTRAL NERVOUS SYSTEM: No focal deficits, tone is normal in all 4 extremities.


EXTREMITIES: There is no peripheral edema, clubbing, or cyanosis.  Peripheral 

pulses are intact.








- Labs


CBC & Chem 7: 


                                 12/18/24 03:31





                                 12/21/24 03:45





Assessment and Plan


Assessment: 





Severe hyponatremia, appears euvolemic, suspect SIADH, possibly secondary to 

respiratory infection, and the sodium level has normalized





Acute COPD exacerbation, likely secondary to viral pneumonia, patient was found 

to have a parainfluenza infection and her viral screen that was done in [facility].


 


Shortness of breath secondary to above





Hypomagnesemia, replaced





History of rheumatoid arthritis, normally maintained on Xeljanz





Hypertension





Remote history of tobacco use over 50 years ago








Plan:





Clinically improving


Currently on room air


Continue supportive care including antitussive, antipyretics, 


Continue bronchodilators


Continue IV Solu-Medrol 40 mg every 8 hours


Sodium level is normalized


Procalcitonin level is not elevated


We will continue to follow.

## 2024-12-22 LAB
ANION GAP SERPL CALC-SCNC: 8.4 MMOL/L (ref 4–12)
BUN SERPL-SCNC: 52 MG/DL (ref 9–27)
BUN/CREAT SERPL: 65 RATIO (ref 12–20)
CALCIUM SPEC-MCNC: 10.8 MG/DL (ref 8.7–10.3)
CHLORIDE SERPL-SCNC: 95 MMOL/L (ref 96–109)
CO2 SERPL-SCNC: 30.6 MMOL/L (ref 21.6–31.8)
GLUCOSE SERPL-MCNC: 125 MG/DL (ref 70–110)
MAGNESIUM SPEC-SCNC: 1.9 MG/DL (ref 1.5–2.4)
POTASSIUM SERPL-SCNC: 5.5 MMOL/L (ref 3.5–5.5)
SODIUM SERPL-SCNC: 134 MMOL/L (ref 135–145)

## 2024-12-22 RX ADMIN — ONDANSETRON PRN MG: 2 INJECTION INTRAMUSCULAR; INTRAVENOUS at 19:00

## 2024-12-22 NOTE — P.PN
Subjective


Progress Note Date: 12/22/24











Patient is 84-year-old female with past medical history significant for 

hypertension, rheumatoid arthritis, and remote history of tobacco use over 50 

years ago.  Patient was transferred from [facility] for hyponatremia.  

Over the last  2 weeks patient has had URI-like symptoms including nasal 

congestion, sore throat, and cough.  Cough has become more congested and coarse.

Mostly nonproductive. Denies any fevers, significant sputum production, chest 

pain, hemoptysis.  Denies nausea or vomiting.  Some small amounts of self-

limiting loose stools.  Appetite has been poor.  Viral screen at [facility] positive for parainfluenza virus.  Also, her sodium level was noted to 

be low.  She was transferred to [facility] for nephrology 

consultation.  Sodium noted to be 119 on arrival to our facility.  Patient was 

admitted to the general medical floor.  Has not been started on any hypertonic 

saline.  She is alert and oriented.  No seizure activity.  She states that her 

sodium has been low in the past, and was previously was on sodium tabs.  She 

states that she has been drinking about 3 bottles of water per day due to her 

recent illness.  She appears euvolemic.  No SSRIs. Does take mirtazapine and 

NSAIDs.  Denies diuretic use.  She has a coarse congested cough.  Nonproductive.

 She is on room air.  Denies any pre-existing lung disease.  Not in any 

respiratory distress..Chest x-ray showing bilateral interstitial densities 

consistent with possible atypical pneumonia.  No obvious focal densities or 

masses.  CBC: WBC count 5.9, hemoglobin 10.3, hematocrit 31.8, platelets 240.  

CMP: Sodium 118, potassium 3.9, chloride 91, serum bicarb 25, BUN 11, creatinine

0.52, glucose 93.  LFTs unremarkable.  Magnesium 1.4.  TSH 0.792.  Serum 

osmolality 258.  Urine sodium 73.  Urine osmolality pending.  Nephrology is 

managing.  Current vitals: Temperature 98.1 F, heart rate 64 bpm, blood 

pressure 161/69 mmHg, nontachypneic, SpO2 93% on room air.








On today's evaluation of 12/20/2024, the patient is being seen for a follow-up. 

The patient is slightly improved compared to yesterday.  Less bronchospastic and

wheezy.  The patient has a parainfluenza pneumonia but COPD exacerbation.  The 

patient is on IV Solu-Medrol 40 mg every 12 hours.  She is also on Ventolin 

nebulized treatments around-the-clock.  Her sodium level continues to improve 

and his sodium level is currently up to 126.  Electrolytes are all stable.  

Chloride is 90.  BUN is 54 with a creatinine of 0.8.  The viral screen including

influenza RSV and COVID-19 were negative.  The patient is currently on oxygen at

room air with a pulse ox of 93%.  No altered mentation.  Family is at the 

bedside.





On 12/21/2024, the patient is being seen for a follow-up.  Doing well.  Less 

bronchospastic and wheezy compared to yesterday.  Remains on steroids.  No new 

complaints.  She is on room air oxygen.  Follow-up sodium levels at 132, BUN 63 

with a creatinine of 0.8 and a potassium level is at 4.8.  She remains on 

Ventolin 4 times daily around-the-clock nebulized treatments and she remains on 

IV Solu-Medrol 40 mg every 12 hours.  Feeling better.





On 12/22/2024, the patient is being seen for a follow-up.  Doing well.  No new 

complaints.  Remains on bronchodilators.  Remains on steroids.  Cough and 

wheezing and congestion is somewhat improved if not completely subsided.  Sodium

level stable at 134.  Otherwise, no other significant events overnight.  She is 

resting comfortably in bed.  BUN is 52 with a creatinine of 0.8.  The sodium 

level is at 134, K is 5.5, bicarb is at 30.  Remains on Lovenox for DVT 

prophylaxis.  Remains on IV Solu-Medrol 4 mg every 12 hours.  Remains on 

Ventolin nebulized treatments 4 times daily and as needed.





Objective





- Vital Signs


Vital signs: 


                                   Vital Signs











Temp  97.4 F L  12/22/24 07:15


 


Pulse  71   12/22/24 09:25


 


Resp  16   12/22/24 09:25


 


BP  171/95   12/22/24 07:15


 


Pulse Ox  94 L  12/22/24 09:10


 


FiO2      








                                 Intake & Output











 12/21/24 12/22/24 12/22/24





 18:59 06:59 18:59


 


Intake Total  240 50


 


Balance  240 50


 


Intake:   


 


  Oral  240 50


 


Other:   


 


  Voiding Method  Bedside Commode Toilet





  Diaper 


 


  # Voids 2 3 














- Exam








GENERAL EXAM: Alert and fully oriented, 84-year-old female, well-nourished, 

appears euvolemic.


HEAD: Normocephalic and atraumatic


EYES: Normal reaction of pupils, equal size.


NOSE: Clear with pink turbinates.


THROAT: No erythema or exudates.


NECK: No masses, no JVD.


CHEST: No chest wall deformity.


LUNGS: Equal air entry with coarse bilateral rhonchi and expiratory wheezing.  

On room air.  No conversational dyspnea or accessory muscle use.. 


CVS: S1 and S2 normal with no audible murmur, regular rhythm. No extra heart 

sounds


ABDOMEN: No hepatosplenomegaly, active bowel sounds, no guarding or rigidity. 


SPINE: No scoliosis or deformity


SKIN: No rashes


CENTRAL NERVOUS SYSTEM: No focal deficits, tone is normal in all 4 extremities.


EXTREMITIES: There is no peripheral edema, clubbing, or cyanosis.  Peripheral 

pulses are intact.








- Labs


CBC & Chem 7: 


                                 12/18/24 03:31





                                 12/22/24 06:01


Labs: 


                  Abnormal Lab Results - Last 24 Hours (Table)











  12/21/24 Range/Units





  03:45 


 


Sodium  132 L  (135-145)  mmol/L


 


Chloride  94 L  ()  mmol/L


 


BUN  63.8 H  (9.0-27.0)  mg/dL


 


BUN/Creatinine Ratio  79.75 H  (12.00-20.00)  Ratio


 


Glucose  140 H  ()  mg/dL














Assessment and Plan


Assessment: 





Severe hyponatremia, appears euvolemic, suspect SIADH, possibly secondary to 

respiratory infection, and the sodium level has normalized





Acute COPD exacerbation, likely secondary to viral pneumonia, patient was found 

to have a parainfluenza infection and her viral screen that was done in [facility].


 


Shortness of breath secondary to above





Hypomagnesemia, replaced





History of rheumatoid arthritis, normally maintained on Xeljanz





Hypertension





Remote history of tobacco use over 50 years ago








Plan:





Clinically improving although the patient remains somewhat congested 

bronchospastic and wheezy.  However, overall, she is improving


Currently on room air


Continue supportive care including antitussive, antipyretics, 


Continue bronchodilators


Continue IV Solu-Medrol 40 mg every 12 hours for another 24 hours


Sodium level is normalized


Procalcitonin level is not elevated


We will continue to follow.

## 2024-12-22 NOTE — P.PN
Subjective





Patient is seen in follow-up for hyponatremia.  Sodium level 132 yesterday.  

Oral intake is fair.  Has been voiding.





Vital signs are stable.


General: No acute distress.


HEENT: Head exam is unremarkable. 


LUNGS: No audible rhonchi or wheezes.


HEART: Rate and Rhythm are regular. 


ABDOMEN: Nontender.


EXTREMITITES: No edema.





Objective





- Vital Signs


Vital signs: 


                                   Vital Signs











Temp  97.4 F L  12/22/24 07:15


 


Pulse  71   12/22/24 09:25


 


Resp  16   12/22/24 09:25


 


BP  171/95   12/22/24 07:15


 


Pulse Ox  94 L  12/22/24 09:10


 


FiO2      








                                 Intake & Output











 12/21/24 12/22/24 12/22/24





 18:59 06:59 18:59


 


Intake Total  240 50


 


Balance  240 50


 


Intake:   


 


  Oral  240 50


 


Other:   


 


  Voiding Method  Bedside Commode 





  Diaper 


 


  # Voids 2 3 














- Labs


CBC & Chem 7: 


                                 12/18/24 03:31





                                 12/21/24 03:45


Labs: 


                  Abnormal Lab Results - Last 24 Hours (Table)











  12/21/24 Range/Units





  03:45 


 


Sodium  132 L  (135-145)  mmol/L


 


Chloride  94 L  ()  mmol/L


 


BUN  63.8 H  (9.0-27.0)  mg/dL


 


BUN/Creatinine Ratio  79.75 H  (12.00-20.00)  Ratio


 


Glucose  140 H  ()  mg/dL














Assessment and Plan


Plan: 





Assessment:


1.  Hyponatremia.  Appears euvolemic.  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

poor solute intake and NSAID use (received mobic this admission).  No 

improvement in sodium level post IV fluids.  Status post Lasix and Samsca this 

admission.  Sodium level 132 yesterday.  TSH normal.  Serum osmolality 258.  

Urine sodium 73 and urine osmolality 528.


2.  Benign hypertension.  Exacerbated by pain and steroids.


3.  Hypomagnesemia from poor intake.  On oral magnesium oxide.  Better.


4.  Hypophosphatemia from poor intake.  Expect improvement with better oral 

intake.  Better.


5.  Urinary retention.  Required straight catheterization once.  On Flomax.


6.  Influenza A infection.





Plan:


Status post urea and Samsca this admission.


Maintain 1500 cc fluid restriction.


Encouraged oral intake.


Avoid NSAIDs.


Morning labs pending.


Replace electrolytes as needed.

## 2024-12-22 NOTE — P.PN
Subjective


Progress Note Date: 12/22/24


Pleasant 84-year-old patient follows with Dr. Strange.  Chronic stable medical 

conditions include interstitial lung disease/hiatal hernia/essential 

hypertension/rheumatoid arthritis.


Patient will present 1 week of increasing cough.  Some clear sputum.  Decreased 

appetite.  Tired rundown.  Not able to sleep well.  Patient is transferred from 

[facility].  Patient has low sodium and also tested positive for 

influenza at the transferring hospital.  Patient also complains of joint pains. 

Normally at home she just takes Tylenol.  Some flareup with her getting sick.  

Patient not been eating much.  Though she drinks quite a bit of water.  

Patient's son and daughter at the bedside.  Just completed a course of 

antibiotic for UTI.





December 19: A bit tired.  Did sit up in a chair.  Eating some.  Remains on 

fluid restriction.  Sodium only slowly coming up 122.  Being followed by 

nephrology.Cut back Solu-Medrol.  Patient given 1 dose of Samsca by nephrology 

earlier





December 20: Encourage oral intake.  Sodium up to 126.  Samsca and urea given 

today by nephrology.  Water intake discussed with the patient's son at the 

bedside.  Increase activity.


Repeat panel for influenza negative.





12/21.  Patient seen and examined.  States she feels better.  Still being 

lethargic and weakness





12/22.  Patient seen and examined.  Sodium level this morning is 134.  States 

she feels better.  Shortness of breath is improved.





REVIEW OF SYSTEMS: 


CONSTITUTIONAL: No fever, no malaise,. 


CARDIOVASCULAR: No chest pain, no palpitations, no syncope. 


PULMONARY: No shortness of breath, no cough, 


GASTROINTESTINAL: No diarrhea, no nausea, no vomiting, no abdominal pain. 


NEUROLOGICAL: No headaches, no weakness, 





PHYSICAL EXAMINATION: 





GENERAL: The patient is alert and oriented x3, ill looking


HEENT: Pupils are round and equally reacting to light. EOMI. No scleral icterus.

No conjunctival pallor. Normocephalic, atraumatic. No pharyngeal erythema. No 

thyromegaly. 


CARDIOVASCULAR: S1 and S2 present. No murmurs, rubs, or gallops. 


PULMONARY: Chest is clear to auscultation, no wheezing or crackles. 


ABDOMEN: Soft, nontender, nondistended, normoactive bowel sounds. No palpable 

organomegaly. 


MUSCULOSKELETAL: No joint swelling or deformity.


EXTREMITIES: No cyanosis, clubbing, or pedal edema. 


NEUROLOGICAL: Gross neurological examination did not reveal any focal deficits. 


SKIN: No rashes. 





Assessment and plan


-Severe hyponatremia, likely hypovolemia and hypoosmolar 


Monitor vital signs monitor CBC


Monitor CMP


Fluid restriction. 


 Nephrology following





-Acute influenza pneumonitis, improved


-Element of COPD exacerbation in a prior smoker with underlying pulmonary 

fibrosis


-Chronic pulmonary fibrosis, from underlying rheumatoid arthritis


Continue breathing treatments: Continue IV Solu-Medrol, pulmonology following





-Chronic rheumatoid arthritis, acute flareup from underlying infection decreased

mobility


xeljanz-hold for now, Celebrex


 Ultram 50 mg every 8 as needed








-Primary osteoarthritis


Celebrex





-Essential hypertension


Losartan, Lopressor





-Chronic gait dysfunction, uses a walker at baseline


Fall precautions





Labs and medication were reviewed..  Continue same treatment.  Continue with 

symptomatic treatment.  Resume home medication.  Monitor labs and vitals.  DVT 

and GI prophylaxis.  Further recommendations as per clinical course of the 

patient








Dictation was produced using dragon dictation software. please excuse any 

grammatical, word or spelling errors. 











Objective





- Vital Signs


Vital signs: 


                                   Vital Signs











Temp  97.4 F L  12/22/24 07:15


 


Pulse  71   12/22/24 09:25


 


Resp  16   12/22/24 09:25


 


BP  171/95   12/22/24 07:15


 


Pulse Ox  94 L  12/22/24 09:10


 


FiO2      








                                 Intake & Output











 12/21/24 12/22/24 12/22/24





 18:59 06:59 18:59


 


Intake Total  240 50


 


Balance  240 50


 


Intake:   


 


  Oral  240 50


 


Other:   


 


  Voiding Method  Bedside Commode Toilet





  Diaper 


 


  # Voids 2 3 














- Labs


CBC & Chem 7: 


                                 12/18/24 03:31





                                 12/22/24 06:01


Labs: 


                  Abnormal Lab Results - Last 24 Hours (Table)











  12/21/24 Range/Units





  03:45 


 


Sodium  132 L  (135-145)  mmol/L


 


Chloride  94 L  ()  mmol/L


 


BUN  63.8 H  (9.0-27.0)  mg/dL


 


BUN/Creatinine Ratio  79.75 H  (12.00-20.00)  Ratio


 


Glucose  140 H  ()  mg/dL

## 2024-12-23 LAB
ANION GAP SERPL CALC-SCNC: 7 MMOL/L (ref 4–12)
BUN SERPL-SCNC: 41.5 MG/DL (ref 9–27)
BUN/CREAT SERPL: 51.88 RATIO (ref 12–20)
CALCIUM SPEC-MCNC: 9.7 MG/DL (ref 8.7–10.3)
CHLORIDE SERPL-SCNC: 94 MMOL/L (ref 96–109)
CO2 SERPL-SCNC: 30 MMOL/L (ref 21.6–31.8)
GLUCOSE SERPL-MCNC: 144 MG/DL (ref 70–110)
MAGNESIUM SPEC-SCNC: 1.8 MG/DL (ref 1.5–2.4)
POTASSIUM SERPL-SCNC: 5.4 MMOL/L (ref 3.5–5.5)
SODIUM SERPL-SCNC: 131 MMOL/L (ref 135–145)

## 2024-12-23 NOTE — P.PN
Subjective





Patient is seen in follow-up for hyponatremia.  Sodium level 131 today.  Oral 

intake is fair.  Has been voiding.





Vital signs are stable.


General: No acute distress.


HEENT: Head exam is unremarkable. 


LUNGS: No audible rhonchi or wheezes.


HEART: Rate and Rhythm are regular. 


ABDOMEN: Nontender.


EXTREMITITES: No edema.





Objective





- Vital Signs


Vital signs: 


                                   Vital Signs











Temp  98.8 F   12/23/24 07:09


 


Pulse  76   12/23/24 09:26


 


Resp  18   12/23/24 07:09


 


BP  155/80   12/23/24 07:09


 


Pulse Ox  95   12/23/24 07:09


 


FiO2      








                                 Intake & Output











 12/22/24 12/23/24 12/23/24





 18:59 06:59 18:59


 


Intake Total 610  75


 


Balance 610  75


 


Intake:   


 


  Oral 610  75


 


Other:   


 


  Voiding Method Toilet Toilet 





  Diaper 


 


  # Voids 3 1 














- Labs


CBC & Chem 7: 


                                 12/18/24 03:31





                                 12/23/24 03:00


Labs: 


                  Abnormal Lab Results - Last 24 Hours (Table)











  12/22/24 12/23/24 Range/Units





  06:01 03:00 


 


Sodium  134 L  131 L  (135-145)  mmol/L


 


Chloride  95 L  94 L  ()  mmol/L


 


BUN  52.0 H  41.5 H  (9.0-27.0)  mg/dL


 


BUN/Creatinine Ratio  65.00 H  51.88 H  (12.00-20.00)  Ratio


 


Glucose  125 H  144 H  ()  mg/dL


 


Calcium  10.8 H   (8.7-10.3)  mg/dL














Assessment and Plan


Plan: 





Assessment:


1.  Hyponatremia.  Appears euvolemic.  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

poor solute intake and NSAID use (received mobic this admission).  No impro

vement in sodium level post IV fluids.  Status post Lasix and Samsca this 

admission.  Sodium level 131 today..  TSH normal.  Serum osmolality 258.  Urine 

sodium 73 and urine osmolality 528.


2.  Benign hypertension.  Exacerbated by pain and steroids.


3.  Hypomagnesemia from poor intake.  On oral magnesium oxide.  Better.


4.  Hypophosphatemia from poor intake.  Expect improvement with better oral in

take.  Better.


5.  Urinary retention.  Required straight catheterization once.  On Flomax.


6.  Influenza A infection.





Plan:


Resume urea.


Maintain 1500 cc fluid restriction.


Encouraged oral intake.


Avoid NSAIDs.


Replace electrolytes as needed.

## 2024-12-23 NOTE — CDI
Documentation Clarification Form



Date: 12/23/2024 07:20:17 PM

From: Kavitha Morales RN, CCDS

Phone: [phone]

MRN: [id number]

Admit Date: 12/17/2024 02:49:00 PM

Patient Name: Tova Wong

Visit Number: [id number]

Discharge Date:  





ATTENTION: The Clinical Documentation Specialists (CDI) and [facility] Coding Staff 
appreciate your assistance in clarifying documentation. Please respond to the 
clarification below the line at the bottom and electronically sign. The CDI & 
[facility] Coding staff will review the response and follow-up if needed. Please note: 
Queries are made part of the Legal Health Record. If you have any questions, 
please contact the author of this message via ITS.



Doctor. Linwood Floyd





Conflicting documentation has been found in the medical record.  As attending 
physician, please provide clarification.



Nephrology consult and subsequent progress notes: Hyponatremia. Appears 
euvolemic. 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 
NSAID use (received Mobic this admission). No improvement in sodium level post 
IV fluids



Attending progress note: -Severe hyponatremia, likely hypovolemia and 
hypoosmolar



History/Risk Factors: GERD/Reflux, Hypertension, Pneumonia, Rheumatoid Arthritis



Clinical Indicators:  84-year-old female to the ER for evaluation patient 
presents today with f low sodium altered mental state abnormal lab values. She 
feels weak but states have been drinking a lot of water because she felt 
dehydrated. She also tested positive for influenza. 

VS: 98.1, 70, 22, 169/ 86, 93% room air Alert and oriented x3.

December 17: Sodium 119, potassium 4.2 creatinine 0.5, Serum osmolality 258.



Treatment:

Fluid restriction 1,500 cc

Monitor intake and output 

Avoid NSAIDs 

Lasix 20MG IV Once now 

Repeat sodium level daily and per order 



Please clarify which diagnosis is most appropriate:



[  ] Hyponatremia due to SIADH

[ x ] Severe hyponatremia, likely hypovolemia and hypoosmolar

[  ] Other (please specify) __________

[  ] Unable to determine 



(Template Last Revised: March 2021)

___________________________________________________________________________



MTDD

## 2024-12-23 NOTE — P.DS
Providers


Date of admission: 


12/17/24 14:49





Expected date of discharge: 12/23/24


Attending physician: 


John Stone





Consults: 





                                        





12/17/24 14:48


Consult Physician Routine 


   Consulting Provider: Irving Richardson


   Consult Reason/Comments: hypoNa


   Do you want consulting provider notified?: Yes





12/18/24 11:59


Consult Physician Routine 


   Consulting Provider: Bhavik Mccain


   Consult Reason/Comments: Sob


   Do you want consulting provider notified?: Yes











Primary care physician: 


Jem Strange





[facility] Course: 


Discharge diagnoses;


-Severe hyponatremia, likely hypovolemia and hypoosmolar 


Monitor vital signs monitor CBC


Monitor CMP


Fluid restriction. 


 Nephrology following, recommended discharging patient on urea  and 1500 mL 

fluid restriction





-Acute influenza pneumonitis, improved


-Element of COPD exacerbation in a prior smoker with underlying pulmonary 

fibrosis


-Chronic pulmonary fibrosis, from underlying rheumatoid arthritis


Continue breathing treatments: Been discharged on prednisone taper





-Chronic rheumatoid arthritis, acute flareup from underlying infection decreased

mobility


Continue pain control








-Primary osteoarthritis


Celebrex





-Essential hypertension


Losartan, Lopressor





-Chronic gait dysfunction, uses a walker at baseline


Fall precautions





Hospital course;


Pleasant 84-year-old patient follows with Dr. Strange.  Chronic stable medical 

conditions include interstitial lung disease/hiatal hernia/essential 

hypertension/rheumatoid arthritis.


Patient will present 1 week of increasing cough.  Some clear sputum.  Decreased 

appetite.  Tired rundown.  Not able to sleep well.  Patient is transferred from 

[facility].  Patient has low sodium and also tested positive for 

influenza at the transferring hospital.  Patient also complains of joint pains. 

Normally at home she just takes Tylenol.  Some flareup with her getting sick.  

Patient not been eating much.  Though she drinks quite a bit of water.  

Patient's son and daughter at the bedside.  Just completed a course of 

antibiotic for UTI.





December 19: A bit tired.  Did sit up in a chair.  Eating some.  Remains on 

fluid restriction.  Sodium only slowly coming up 122.  Being followed by 

nephrology.Cut back Solu-Medrol.  Patient given 1 dose of Samsca by nephrology 

earlier





December 20: Encourage oral intake.  Sodium up to 126.  Samsca and urea given 

today by nephrology.  Water intake discussed with the patient's son at the 

bedside.  Increase activity.


Repeat panel for influenza negative.





12/21.  Patient seen and examined.  States she feels better.  Still being 

lethargic and weakness





12/22.  Patient seen and examined.  Sodium level this morning is 134.  States 

she feels better.  Shortness of breath is improved.





12/23.  Patient seen and examined.  Sodium this morning is 131, IV Solu-Medrol 

switched to oral prednisone.  Nephrology recommended 1500 mL fluid restriction 

and urea.





PHYSICAL EXAMINATION: 





GENERAL: The patient is alert and oriented x3, not in any acute distress. Well 

developed, well nourished. 


HEENT: Pupils are round and equally reacting to light. EOMI. No scleral icterus.

No conjunctival pallor. Normocephalic, atraumatic. No pharyngeal erythema. No 

thyromegaly. 


CARDIOVASCULAR: S1 and S2 present. No murmurs, rubs, or gallops. 


PULMONARY: Chest is clear to auscultation, no wheezing or crackles. 


ABDOMEN: Soft, nontender, nondistended, normoactive bowel sounds. No palpable 

organomegaly. 


MUSCULOSKELETAL: No joint swelling or deformity.


EXTREMITIES: No cyanosis, clubbing, or pedal edema. 


NEUROLOGICAL: Gross neurological examination did not reveal any focal deficits. 


SKIN: No rashes. 











Dictation was produced using dragon dictation software. please excuse any 

grammatical, word or spelling errors.





Patient Condition at Discharge: Good





Plan - Discharge Summary


New Discharge Prescriptions: 


New


   Albuterol Nebulized [Ventolin Nebulized] 2.5 mg INHALATION RT-QID PRN  ml


     PRN Reason: Shortness Of Breath Or Wheezing


   Tamsulosin [Flomax] 0.4 mg PO PC-BRKFST #30 cap


   predniSONE 10 mg PO DAILY 8 Days #20 tab NS





Continue


   Tofacitinib Citrate [Xeljanz Xr] 11 mg PO AS DIRECTED


   Metoprolol Tartrate [Lopressor] 50 mg PO BID


   Fluticasone Nasal Spray [Flonase Nasal Spray] 1 spr EA NOSTRIL DAILY


   Losartan Potassium [Cozaar] 100 mg PO DAILY


   busPIRone HCl [Buspar] 5 mg PO BID


   Mirtazapine [Remeron] 15 mg PO HS





Discontinued


   Celecoxib [CeleBREX] 400 mg PO DAILY


   Ciprofloxacin HCl [Cipro] 500 mg PO Q12HR


Discharge Medication List





Tofacitinib Citrate [Xeljanz Xr] 11 mg PO AS DIRECTED 01/04/21 [History]


Metoprolol Tartrate [Lopressor] 50 mg PO BID 12/02/22 [History]


Fluticasone Nasal Spray [Flonase Nasal Spray] 1 spr EA NOSTRIL DAILY 12/11/22 

[History]


Losartan Potassium [Cozaar] 100 mg PO DAILY 12/17/24 [History]


Mirtazapine [Remeron] 15 mg PO HS 12/17/24 [History]


busPIRone HCl [Buspar] 5 mg PO BID 12/17/24 [History]


Albuterol Nebulized [Ventolin Nebulized] 2.5 mg INHALATION RT-QID PRN  ml 

12/23/24 [Rx]


Tamsulosin [Flomax] 0.4 mg PO PC-BRKFST #30 cap 12/23/24 [Rx]


predniSONE 10 mg PO DAILY 8 Days #20 tab NS 12/23/24 [Rx]








Follow up Appointment(s)/Referral(s): 


Jem Strange MD [Primary Care Provider] - 1-2 days


Activity/Diet/Wound Care/Special Instructions: 


Regarding prednisone prednisone; 


day 1 and 2, take 4 tabs daily


Day 3 and 4, take 3 tabs daily


Day 5 and 6, take 2 tabs daily


Day of 7 and 8, take 1 tab daily and stop





Patient needs to take 15 g of urea p.o. daily,


Discharge Disposition: TRANSFER TO SNF/ECF

## 2024-12-23 NOTE — P.PN
Subjective


Progress Note Date: 12/23/24





Patient is 84-year-old female with past medical history significant for 

hypertension, rheumatoid arthritis, and remote history of tobacco use over 50 

years ago.  Patient was transferred from [facility] for hyponatremia.  

Over the last  2 weeks patient has had URI-like symptoms including nasal conge

stion, sore throat, and cough.  Cough has become more congested and coarse. 

Mostly nonproductive. Denies any fevers, significant sputum production, chest 

pain, hemoptysis.  Denies nausea or vomiting.  Some small amounts of self-

limiting loose stools.  Appetite has been poor.  Viral screen at [facility] positive for parainfluenza virus.  Also, her sodium level was noted to 

be low.  She was transferred to [facility] for nephrology 

consultation.  Sodium noted to be 119 on arrival to our facility.  Patient was 

admitted to the general medical floor.  Has not been started on any hypertonic 

saline.  She is alert and oriented.  No seizure activity.  She states that her 

sodium has been low in the past, and was previously was on sodium tabs.  She 

states that she has been drinking about 3 bottles of water per day due to her 

recent illness.  She appears euvolemic.  No SSRIs. Does take mirtazapine and 

NSAIDs.  Denies diuretic use.  She has a coarse congested cough.  Nonproductive.

 She is on room air.  Denies any pre-existing lung disease.  Not in any 

respiratory distress..Chest x-ray showing bilateral interstitial densities 

consistent with possible atypical pneumonia.  No obvious focal densities or 

masses.  CBC: WBC count 5.9, hemoglobin 10.3, hematocrit 31.8, platelets 240.  

CMP: Sodium 118, potassium 3.9, chloride 91, serum bicarb 25, BUN 11, creatinine

0.52, glucose 93.  LFTs unremarkable.  Magnesium 1.4.  TSH 0.792.  Serum 

osmolality 258.  Urine sodium 73.  Urine osmolality pending.  Nephrology is 

managing.  Current vitals: Temperature 98.1 F, heart rate 64 bpm, blood 

pressure 161/69 mmHg, nontachypneic, SpO2 93% on room air.








On today's evaluation of 12/20/2024, the patient is being seen for a follow-up. 

The patient is slightly improved compared to yesterday.  Less bronchospastic and

wheezy.  The patient has a parainfluenza pneumonia but COPD exacerbation.  The 

patient is on IV Solu-Medrol 40 mg every 12 hours.  She is also on Ventolin 

nebulized treatments around-the-clock.  Her sodium level continues to improve 

and his sodium level is currently up to 126.  Electrolytes are all stable.  

Chloride is 90.  BUN is 54 with a creatinine of 0.8.  The viral screen including

influenza RSV and COVID-19 were negative.  The patient is currently on oxygen at

room air with a pulse ox of 93%.  No altered mentation.  Family is at the 

bedside.





On 12/21/2024, the patient is being seen for a follow-up.  Doing well.  Less 

bronchospastic and wheezy compared to yesterday.  Remains on steroids.  No new 

complaints.  She is on room air oxygen.  Follow-up sodium levels at 132, BUN 63 

with a creatinine of 0.8 and a potassium level is at 4.8.  She remains on 

Ventolin 4 times daily around-the-clock nebulized treatments and she remains on 

IV Solu-Medrol 40 mg every 12 hours.  Feeling better.





On 12/22/2024, the patient is being seen for a follow-up.  Doing well.  No new 

complaints.  Remains on bronchodilators.  Remains on steroids.  Cough and 

wheezing and congestion is somewhat improved if not completely subsided.  Sodium

level stable at 134.  Otherwise, no other significant events overnight.  She is 

resting comfortably in bed.  BUN is 52 with a creatinine of 0.8.  The sodium 

level is at 134, K is 5.5, bicarb is at 30.  Remains on Lovenox for DVT pr

ophylaxis.  Remains on IV Solu-Medrol 4 mg every 12 hours.  Remains on Ventolin 

nebulized treatments 4 times daily and as needed.





The patient is seen today December 23, 2024 in follow-up on the regular medical 

floor.  She is currently sitting up in a chair.  Awake and alert in no acute 

distress.  Maintaining good O2 saturations in the 90s on room air.  Afebrile.  

Hemodynamically stable.  Sodium 131.  Potassium 5.4.  Bicarb 30.  BUN 42.  

Creatinine 0.8.  Glucose 144.  Remains on bronchodilators.  Remains on Solu-

Medrol.  Lovenox for DVT prophylaxis.





Objective





- Vital Signs


Vital signs: 


                                   Vital Signs











Temp  98.8 F   12/23/24 07:09


 


Pulse  76   12/23/24 09:26


 


Resp  18   12/23/24 07:09


 


BP  155/80   12/23/24 07:09


 


Pulse Ox  95   12/23/24 07:09


 


FiO2      








                                 Intake & Output











 12/22/24 12/23/24 12/23/24





 18:59 06:59 18:59


 


Intake Total 610  75


 


Balance 610  75


 


Intake:   


 


  Oral 610  75


 


Other:   


 


  Voiding Method Toilet Toilet 





  Diaper 


 


  # Voids 3 1 














- Exam





GENERAL EXAM: Alert, pleasant 84-year-old female, up in a chair, on room air, 

comfortable in no apparent distress.


HEAD: Normocephalic.


EYES: Normal reaction of pupils, equal size.


NOSE: Clear with pink turbinates.


THROAT: No erythema or exudates.


NECK: No masses, no JVD.


CHEST: No chest wall deformity.


LUNGS: Equal air entry with no crackles, wheeze, rhonchi or dullness.


CVS: S1 and S2 normal with no audible murmur, regular rhythm.


ABDOMEN: No hepatosplenomegaly, normal bowel sounds, no guarding or rigidity.


SPINE: No scoliosis or deformity


SKIN: No rashes


CENTRAL NERVOUS SYSTEM: No focal deficits, tone is normal in all 4 extremities.


EXTREMITIES: There is no peripheral edema.  No clubbing, no cyanosis.  

Peripheral pulses are intact.





- Labs


CBC & Chem 7: 


                                 12/18/24 03:31





                                 12/23/24 03:00


Labs: 


                  Abnormal Lab Results - Last 24 Hours (Table)











  12/23/24 Range/Units





  03:00 


 


Sodium  131 L  (135-145)  mmol/L


 


Chloride  94 L  ()  mmol/L


 


BUN  41.5 H  (9.0-27.0)  mg/dL


 


BUN/Creatinine Ratio  51.88 H  (12.00-20.00)  Ratio


 


Glucose  144 H  ()  mg/dL














Assessment and Plan


Assessment: 





Severe hyponatremia, appears euvolemic, suspect SIADH, possibly secondary to 

respiratory infection, and the sodium level has normalized





Acute COPD exacerbation, likely secondary to viral pneumonia, patient was found 

to have a parainfluenza infection and her viral screen that was done in [facility]


 


Shortness of breath secondary to above





Hypomagnesemia, replaced





History of rheumatoid arthritis, normally maintained on Xeljanz





Hypertension





Remote history of tobacco use over 50 years ago





Plan:





The patient was seen and evaluated


Labs and medications reviewed


Discontinue Solu-Medrol


Initiate a prednisone taper


Continue bronchodilators


Sodium level 131


Nephrology is following


Plan is for subacute rehab in [place] at discharge





I have personally seen and examined the patient, performed the documentation and

the assessment and plan as written.  Number of minutes spent on the visit: 10





Dictation was produced using Dragon dictation software.  Please excuse any 

grammatical, word or spelling errors.

## 2024-12-24 VITALS
TEMPERATURE: 97.5 F | RESPIRATION RATE: 16 BRPM | DIASTOLIC BLOOD PRESSURE: 79 MMHG | HEART RATE: 60 BPM | SYSTOLIC BLOOD PRESSURE: 179 MMHG

## 2024-12-24 LAB
ANION GAP SERPL CALC-SCNC: 7.4 MMOL/L (ref 4–12)
BUN SERPL-SCNC: 48.1 MG/DL (ref 9–27)
BUN/CREAT SERPL: 60.12 RATIO (ref 12–20)
CALCIUM SPEC-MCNC: 9.6 MG/DL (ref 8.7–10.3)
CHLORIDE SERPL-SCNC: 96 MMOL/L (ref 96–109)
CO2 SERPL-SCNC: 30.6 MMOL/L (ref 21.6–31.8)
GLUCOSE SERPL-MCNC: 93 MG/DL (ref 70–110)
POTASSIUM SERPL-SCNC: 5 MMOL/L (ref 3.5–5.5)
SODIUM SERPL-SCNC: 134 MMOL/L (ref 135–145)

## 2024-12-24 NOTE — P.PN
Subjective





Patient is seen in follow-up for hyponatremia.  Sodium level 134 today.  Oral 

intake is fair.  Feels nauseated.  Has been voiding.





Vital signs are stable.


General: No acute distress.


HEENT: Head exam is unremarkable. 


LUNGS: No audible rhonchi or wheezes.


HEART: Rate and Rhythm are regular. 


ABDOMEN: Nontender.


EXTREMITITES: No edema.





Objective





- Vital Signs


Vital signs: 


                                   Vital Signs











Temp  97.5 F L  12/24/24 07:08


 


Pulse  60   12/24/24 07:08


 


Resp  16   12/24/24 07:08


 


BP  179/79   12/24/24 07:08


 


Pulse Ox  97   12/24/24 07:08


 


FiO2      








                                 Intake & Output











 12/23/24 12/24/24 12/24/24





 18:59 06:59 18:59


 


Intake Total 475  


 


Balance 475  


 


Weight 86.183 kg  


 


Intake:   


 


  Oral 475  


 


Other:   


 


  Voiding Method  Toilet Toilet





  Diaper Diaper


 


  # Voids  2 














- Labs


CBC & Chem 7: 


                                 12/18/24 03:31





                                 12/24/24 03:51


Labs: 


                  Abnormal Lab Results - Last 24 Hours (Table)











  12/24/24 Range/Units





  03:51 


 


Sodium  134 L  (135-145)  mmol/L


 


BUN  48.1 H  (9.0-27.0)  mg/dL


 


BUN/Creatinine Ratio  60.12 H  (12.00-20.00)  Ratio














Assessment and Plan


Plan: 





Assessment:


1.  Hyponatremia.  Appears euvolemic.  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

poor solute intake and NSAID use (received mobic this admission).  No 

improvement in sodium level post IV fluids.  Status post Lasix and Samsca this 

admission.  Sodium level 134 today..  TSH normal.  Serum osmolality 258.  Urine 

sodium 73 and urine osmolality 528.


2.  Benign hypertension.  Exacerbated by pain and steroids.


3.  Hypomagnesemia from poor intake.  On oral magnesium oxide.  Better.


4.  Hypophosphatemia from poor intake.  Expect improvement with better oral 

intake.  Better.


5.  Urinary retention.  Required straight catheterization once.  On Flomax.


6.  Influenza A infection.





Plan:


Maintain urea.


Maintain 1500 cc fluid restriction.


Encouraged oral intake.


Avoid NSAIDs.


Patient has been discharged already.


Follow-up outpatient 1 week postdischarge.

## 2024-12-24 NOTE — P.PN
Subjective


Progress Note Date: 12/24/24


Principal diagnosis: 





Severe hyponatremia, SIADH related.  And acute exacerbation of COPD








Patient is 84-year-old female with past medical history significant for 

hypertension, rheumatoid arthritis, and remote history of tobacco use over 50 

years ago.  Patient was transferred from [facility] for hyponatremia.  

Over the last  2 weeks patient has had URI-like symptoms including nasal 

congestion, sore throat, and cough.  Cough has become more congested and coarse.

Mostly nonproductive. Denies any fevers, significant sputum production, chest 

pain, hemoptysis.  Denies nausea or vomiting.  Some small amounts of self-

limiting loose stools.  Appetite has been poor.  Viral screen at [facility] positive for parainfluenza virus.  Also, her sodium level was noted to 

be low.  She was transferred to [facility] for nephrology 

consultation.  Sodium noted to be 119 on arrival to our facility.  Patient was 

admitted to the general medical floor.  Has not been started on any hypertonic 

saline.  She is alert and oriented.  No seizure activity.  She states that her 

sodium has been low in the past, and was previously was on sodium tabs.  She 

states that she has been drinking about 3 bottles of water per day due to her 

recent illness.  She appears euvolemic.  No SSRIs. Does take mirtazapine and 

NSAIDs.  Denies diuretic use.  She has a coarse congested cough.  Nonproductive.

 She is on room air.  Denies any pre-existing lung disease.  Not in any resp

iratory distress..Chest x-ray showing bilateral interstitial densities 

consistent with possible atypical pneumonia.  No obvious focal densities or 

masses.  CBC: WBC count 5.9, hemoglobin 10.3, hematocrit 31.8, platelets 240.  

CMP: Sodium 118, potassium 3.9, chloride 91, serum bicarb 25, BUN 11, creatinine

0.52, glucose 93.  LFTs unremarkable.  Magnesium 1.4.  TSH 0.792.  Serum 

osmolality 258.  Urine sodium 73.  Urine osmolality pending.  Nephrology is 

managing.  Current vitals: Temperature 98.1 F, heart rate 64 bpm, blood 

pressure 161/69 mmHg, nontachypneic, SpO2 93% on room air.








On today's evaluation of 12/20/2024, the patient is being seen for a follow-up. 

The patient is slightly improved compared to yesterday.  Less bronchospastic and

wheezy.  The patient has a parainfluenza pneumonia but COPD exacerbation.  The 

patient is on IV Solu-Medrol 40 mg every 12 hours.  She is also on Ventolin 

nebulized treatments around-the-clock.  Her sodium level continues to improve 

and his sodium level is currently up to 126.  Electrolytes are all stable.  

Chloride is 90.  BUN is 54 with a creatinine of 0.8.  The viral screen including

influenza RSV and COVID-19 were negative.  The patient is currently on oxygen at

room air with a pulse ox of 93%.  No altered mentation.  Family is at the bed

side.





On 12/21/2024, the patient is being seen for a follow-up.  Doing well.  Less 

bronchospastic and wheezy compared to yesterday.  Remains on steroids.  No new 

complaints.  She is on room air oxygen.  Follow-up sodium levels at 132, BUN 63 

with a creatinine of 0.8 and a potassium level is at 4.8.  She remains on 

Ventolin 4 times daily around-the-clock nebulized treatments and she remains on 

IV Solu-Medrol 40 mg every 12 hours.  Feeling better.





On 12/22/2024, the patient is being seen for a follow-up.  Doing well.  No new 

complaints.  Remains on bronchodilators.  Remains on steroids.  Cough and 

wheezing and congestion is somewhat improved if not completely subsided.  Sodium

level stable at 134.  Otherwise, no other significant events overnight.  She is 

resting comfortably in bed.  BUN is 52 with a creatinine of 0.8.  The sodium 

level is at 134, K is 5.5, bicarb is at 30.  Remains on Lovenox for DVT 

prophylaxis.  Remains on IV Solu-Medrol 4 mg every 12 hours.  Remains on 

Ventolin nebulized treatments 4 times daily and as needed.





The patient is seen today December 23, 2024 in follow-up on the regular medical 

floor.  She is currently sitting up in a chair.  Awake and alert in no acute 

distress.  Maintaining good O2 saturations in the 90s on room air.  Afebrile.  

Hemodynamically stable.  Sodium 131.  Potassium 5.4.  Bicarb 30.  BUN 42.  

Creatinine 0.8.  Glucose 144.  Remains on bronchodilators.  Remains on Solu-

Medrol.  Lovenox for DVT prophylaxis.





Seen today on 12/24/2024, patient is doing well, asymptomatic, sodium is back to

normal, patient is not in any distress, and I will clear the patient for 

discharge if cleared by other consultants in the meantime would recommend we 

continue bronchodilators, and prednisone burst and taper.  Patient could have 

follow-up on outpatient basis.Sodium 134 potassium 5 BUN is 48 creatinine 0.8








Objective





- Vital Signs


Vital signs: 


                                   Vital Signs











Temp  97.5 F L  12/24/24 07:08


 


Pulse  60   12/24/24 07:08


 


Resp  16   12/24/24 07:08


 


BP  179/79   12/24/24 07:08


 


Pulse Ox  97   12/24/24 07:08


 


FiO2      








                                 Intake & Output











 12/23/24 12/24/24 12/24/24





 18:59 06:59 18:59


 


Intake Total 475  


 


Balance 475  


 


Weight 86.183 kg  


 


Intake:   


 


  Oral 475  


 


Other:   


 


  Voiding Method  Toilet Toilet





  Diaper Diaper


 


  # Voids  2 














- Exam








GENERAL EXAM: Revealed 84-year-old female in no distress


HEAD: Normocephalic.


EYES: Normal reaction of pupils, equal size.


NOSE: Clear with pink turbinates.


THROAT: No erythema or exudates.


NECK: No masses, no JVD.


CHEST: No chest wall deformity.


LUNGS: Equal air entry with no crackles, wheeze, rhonchi or dullness.


CVS: S1 and S2 normal with no audible murmur, regular rhythm.


ABDOMEN: No hepatosplenomegaly, normal bowel sounds, no guarding or rigidity.


SKIN: No rashes


CENTRAL NERVOUS SYSTEM: Alert oriented x 3


EXTREMITIES: No clubbing edema or cyanosis





- Labs


CBC & Chem 7: 


                                 12/18/24 03:31





                                 12/24/24 03:51


Labs: 


                  Abnormal Lab Results - Last 24 Hours (Table)











  12/24/24 Range/Units





  03:51 


 


Sodium  134 L  (135-145)  mmol/L


 


BUN  48.1 H  (9.0-27.0)  mg/dL


 


BUN/Creatinine Ratio  60.12 H  (12.00-20.00)  Ratio














Assessment and Plan


Assessment: 





Impression:


Severe hyponatremia, mild


Acute COPD exacerbation, significantly improved


Shortness of breath secondary to above


Hypomagnesemia, replaced


History of rheumatoid arthritis, normally maintained on Xeljanz


Hypertension


Remote history of tobacco use over 50 years ago





Plan:





Agree with discharge planning


Continue present bronchodilators


Prednisone burst and taper on outpatient basis


Follow-up on outpatient basis


Again cleared for discharge


Time with Patient: Less than 30